# Patient Record
Sex: FEMALE | NOT HISPANIC OR LATINO | Employment: UNEMPLOYED | URBAN - METROPOLITAN AREA
[De-identification: names, ages, dates, MRNs, and addresses within clinical notes are randomized per-mention and may not be internally consistent; named-entity substitution may affect disease eponyms.]

---

## 2017-01-06 ENCOUNTER — HOSPITAL ENCOUNTER (OUTPATIENT)
Dept: LAB | Facility: MEDICAL CENTER | Age: 72
End: 2017-01-06
Attending: INTERNAL MEDICINE
Payer: MEDICARE

## 2017-01-06 DIAGNOSIS — N18.30 CHRONIC KIDNEY DISEASE, STAGE III (MODERATE) (HCC): ICD-10-CM

## 2017-01-06 LAB
25(OH)D3 SERPL-MCNC: 39 NG/ML (ref 30–100)
CREAT UR-MCNC: 28.1 MG/DL
ERYTHROCYTE [DISTWIDTH] IN BLOOD BY AUTOMATED COUNT: 46.5 FL (ref 35.9–50)
HCT VFR BLD AUTO: 37.8 % (ref 37–47)
HGB BLD-MCNC: 12.8 G/DL (ref 12–16)
MCH RBC QN AUTO: 32.3 PG (ref 27–33)
MCHC RBC AUTO-ENTMCNC: 33.9 G/DL (ref 33.6–35)
MCV RBC AUTO: 95.5 FL (ref 81.4–97.8)
MICROALBUMIN UR-MCNC: 4.5 MG/DL
MICROALBUMIN/CREAT UR: 160 MG/G (ref 0–30)
PLATELET # BLD AUTO: 199 K/UL (ref 164–446)
PMV BLD AUTO: 11.6 FL (ref 9–12.9)
PTH-INTACT SERPL-MCNC: 57.7 PG/ML (ref 14–72)
RBC # BLD AUTO: 3.96 M/UL (ref 4.2–5.4)
WBC # BLD AUTO: 8.9 K/UL (ref 4.8–10.8)

## 2017-01-06 PROCEDURE — 80048 BASIC METABOLIC PNL TOTAL CA: CPT

## 2017-01-06 PROCEDURE — 36415 COLL VENOUS BLD VENIPUNCTURE: CPT

## 2017-01-06 PROCEDURE — 82043 UR ALBUMIN QUANTITATIVE: CPT

## 2017-01-06 PROCEDURE — 82306 VITAMIN D 25 HYDROXY: CPT

## 2017-01-06 PROCEDURE — 85027 COMPLETE CBC AUTOMATED: CPT

## 2017-01-06 PROCEDURE — 83970 ASSAY OF PARATHORMONE: CPT

## 2017-01-06 PROCEDURE — 82570 ASSAY OF URINE CREATININE: CPT

## 2017-01-07 LAB
ANION GAP SERPL CALC-SCNC: 10 MMOL/L (ref 0–11.9)
BUN SERPL-MCNC: 23 MG/DL (ref 8–22)
CALCIUM SERPL-MCNC: 9.6 MG/DL (ref 8.5–10.5)
CHLORIDE SERPL-SCNC: 104 MMOL/L (ref 96–112)
CO2 SERPL-SCNC: 23 MMOL/L (ref 20–33)
CREAT SERPL-MCNC: 1.23 MG/DL (ref 0.5–1.4)
GLUCOSE SERPL-MCNC: 89 MG/DL (ref 65–99)
POTASSIUM SERPL-SCNC: 4.1 MMOL/L (ref 3.6–5.5)
SODIUM SERPL-SCNC: 137 MMOL/L (ref 135–145)

## 2017-01-10 ENCOUNTER — TELEPHONE (OUTPATIENT)
Dept: MEDICAL GROUP | Facility: MEDICAL CENTER | Age: 72
End: 2017-01-10

## 2017-01-10 DIAGNOSIS — E11.3299 DIABETES MELLITUS WITH BACKGROUND RETINOPATHY (HCC): ICD-10-CM

## 2017-01-10 NOTE — TELEPHONE ENCOUNTER
VOICEMAIL  1. Caller Name: Lili                      Call Back Number: [phone]     2. Message: Left a VM for the patient letting her know per doctor, prescription has been sent to the patient pharmacy and to call back with any questions.    3. Patient approves office to leave a detailed voicemail/MyChart message: N\A

## 2017-01-10 NOTE — TELEPHONE ENCOUNTER
Was the patient seen in the last year in this department? Yes 10/0716    Does patient have an active prescription for medications requested? No     Received Request Via: Pharmacy

## 2017-01-12 ENCOUNTER — HOSPITAL ENCOUNTER (OUTPATIENT)
Facility: MEDICAL CENTER | Age: 72
End: 2017-01-12
Attending: INTERNAL MEDICINE
Payer: MEDICARE

## 2017-01-12 PROCEDURE — 82274 ASSAY TEST FOR BLOOD FECAL: CPT

## 2017-01-13 ENCOUNTER — OFFICE VISIT (OUTPATIENT)
Dept: NEPHROLOGY | Facility: MEDICAL CENTER | Age: 72
End: 2017-01-13
Payer: MEDICARE

## 2017-01-13 ENCOUNTER — APPOINTMENT (OUTPATIENT)
Dept: NEPHROLOGY | Facility: MEDICAL CENTER | Age: 72
End: 2017-01-13
Payer: MEDICARE

## 2017-01-13 VITALS
TEMPERATURE: 98.7 F | RESPIRATION RATE: 14 BRPM | DIASTOLIC BLOOD PRESSURE: 78 MMHG | HEART RATE: 78 BPM | WEIGHT: 210 LBS | HEIGHT: 62 IN | SYSTOLIC BLOOD PRESSURE: 130 MMHG | BODY MASS INDEX: 38.64 KG/M2

## 2017-01-13 DIAGNOSIS — E11.3299 DIABETES MELLITUS WITH BACKGROUND RETINOPATHY (HCC): ICD-10-CM

## 2017-01-13 DIAGNOSIS — N18.30 CHRONIC KIDNEY DISEASE, STAGE III (MODERATE) (HCC): ICD-10-CM

## 2017-01-13 DIAGNOSIS — I10 ESSENTIAL HYPERTENSION: ICD-10-CM

## 2017-01-13 PROCEDURE — 99213 OFFICE O/P EST LOW 20 MIN: CPT | Performed by: INTERNAL MEDICINE

## 2017-01-13 ASSESSMENT — ENCOUNTER SYMPTOMS
CHILLS: 0
FEVER: 0
PALPITATIONS: 0

## 2017-01-13 NOTE — PROGRESS NOTES
"Subjective:      Abigail Britton is a 71 y.o. female who presents with CKD3 from DN Follow-Up            HPI  70 year old with CKD stage III. She is felt to have chronic kidney disease from diabetic nephropathy.     1. CKD stage III - Cr stable, no uremic symptoms, tolerating BP meds on cozaar  2. HTN - BP at goal  3. Diabetic nephropathy - microalbuminuria, 193, on cozaar     Review of Systems   Constitutional: Negative for fever and chills.   Cardiovascular: Negative for chest pain and palpitations.          Objective:     /78 mmHg  Pulse 78  Temp(Src) 37.1 °C (98.7 °F) (Temporal)  Resp 14  Ht 1.575 m (5' 2\")  Wt 95.255 kg (210 lb)  BMI 38.40 kg/m2     Physical Exam   Constitutional: She is oriented to person, place, and time. She appears well-developed and well-nourished.   Cardiovascular: Normal rate and regular rhythm.    Pulmonary/Chest: Effort normal and breath sounds normal.   Neurological: She is alert and oriented to person, place, and time.   Skin: Skin is warm and dry.               Assessment/Plan:     1. Chronic kidney disease, stage III (moderate)  Cr stable, continue ARB, check CKD labs, discussed with patient and questions answered.  - BASIC METABOLIC PANEL; Future  - CBC WITHOUT DIFFERENTIAL; Future  - PTH INTACT (PTH ONLY); Future  - VITAMIN D,25 HYDROXY; Future  - MICROALBUMIN CREAT RATIO URINE; Future    2. Essential hypertension  BP at goal, continue current medications, has tolerated toprol XL well        "

## 2017-01-13 NOTE — MR AVS SNAPSHOT
"        Abigail Britton   2017 1:45 PM   Office Visit   MRN: [id number]    Department:  [facility]   Dept Phone:  [phone]    Description:  Female : 1945   Provider:  Jamel Gonzalez M.D.           Reason for Visit     Follow-Up           Allergies as of 2017     Allergen Noted Reactions    Lisinopril 2012       \"cough\"      You were diagnosed with     Chronic kidney disease, stage III (moderate)   [585.3.ICD-9-CM]       Essential hypertension   [8708671]         Vital Signs     Blood Pressure Pulse Temperature Respirations Height Weight    130/78 mmHg 78 37.1 °C (98.7 °F) (Temporal) 14 1.575 m (5' 2\") 95.255 kg (210 lb)    Body Mass Index Smoking Status                38.40 kg/m2 Never Smoker           Basic Information     Date Of Birth Sex Race Ethnicity Preferred Language    1945 Female Unknown Non- English      Your appointments     Mar 29, 2017 11:40 AM   Diabetes Care Visit with Alessandro Scott M.D., REBECCA DIABETES RN   [facility] [address]    [address] [facility] [address]  [facility] [id number] [phone]           You will be receiving a confirmation call a few days before your appointment from our automated call confirmation system.              Problem List              ICD-10-CM Priority Class Noted - Resolved    Diabetes type 2, controlled (HCC) E11.9 High  2012 - Present    Essential hypertension I10   2012 - Present    Gout, arthropathy (Chronic) M10.9   2012 - Present    Dyslipidemia E78.5   2012 - Present    Systolic murmur R01.1   2012 - Present    Microalbuminuria R80.9   2012 - Present    MEDICAL HOME    Unknown - Present    Chronic kidney disease, stage III (moderate) N18.3 High  2013 - Present    Diabetic nephropathy (CMS-HCC) E11.21   11/15/2013 - Present    Severe obesity (BMI 35.0-35.9 with comorbidity) (HCC) E66.01, Z68.35   2014 - Present    Asthma, mild intermittent J45.20  "  9/22/2014 - Present    Diabetes mellitus with background retinopathy (HCC) E11.319 High  4/22/2015 - Present    Diabetic macular edema (CMS-HCC) E11.311 High  4/22/2015 - Present    Diastolic dysfunction I51.9 High  4/22/2015 - Present    Secondary hyperparathyroidism (CMS-HCC) N25.81 High  4/22/2015 - Present    History of TIA (transient ischemic attack) (Chronic) Z86.73   5/15/2015 - Present    PAD (peripheral artery disease) (HCC) I73.9   1/5/2016 - Present      Health Maintenance        Date Due Completion Dates    IMM DTaP/Tdap/Td Vaccine (1 - Tdap) 11/14/1966 ---    IMM ZOSTER VACCINE 11/14/2007 ---    COLON CANCER SCREENING ANNUAL FIT 11/25/2016 11/25/2015, 9/30/2014, 2/7/2013    MAMMOGRAM 12/31/2016 12/31/2015, 10/15/2013, 10/14/2013, 8/1/2012 (Done)    Override on 8/1/2012: Done    FASTING LIPID PROFILE 3/31/2017 3/31/2016, 4/22/2015, 9/24/2014, 5/27/2014, 3/27/2014, 11/13/2013, 2/4/2013, 7/25/2012, 3/28/2012 (Done)    Override on 3/28/2012: Done    DIABETES MONOFILAMTENT / LE EXAM 4/4/2017 4/4/2016, 1/9/2015, 11/15/2013, 5/13/2013 (Done), 4/17/2012 (Done)    Override on 5/13/2013: Done    Override on 4/17/2012: Done    A1C SCREENING 4/5/2017 10/5/2016, 6/30/2016, 3/31/2016, 11/16/2015, 8/14/2015, 4/22/2015, 9/24/2014, 5/27/2014, 3/27/2014, 11/13/2013, 5/9/2013, 2/4/2013, 10/23/2012, 7/25/2012, 2/22/2011    RETINAL SCREENING 6/6/2017 6/6/2016, 3/14/2016, 2/1/2016, 10/22/2015, 8/19/2015, 7/17/2015, 6/9/2015    IMM PNEUMOCOCCAL 65+ (ADULT) LOW/MEDIUM RISK SERIES (2 of 2 - PPSV23) 11/1/2017 5/9/2016, 11/1/2012    URINE ACR / MICROALBUMIN 1/6/2018 1/6/2017, 6/30/2016, 3/10/2016, 8/14/2015, 4/22/2015, 4/22/2015, 11/3/2014, 9/24/2014, 5/27/2014, 3/27/2014, 11/13/2013, 9/20/2013, 2/4/2013, 11/1/2012, 7/25/2012    SERUM CREATININE 1/6/2018 1/6/2017, 6/30/2016, 3/31/2016, 3/10/2016, 8/14/2015, 4/22/2015, 4/22/2015, 11/3/2014, 9/24/2014, 9/24/2014, 5/27/2014, 3/27/2014, 11/13/2013, 9/20/2013, 7/16/2013, 1/3/2013,  12/31/2012, 10/23/2012, 8/16/2012, 8/15/2012, 7/25/2012, 3/28/2012 (Done)    Override on 3/28/2012: Done    BONE DENSITY 2/22/2018 2/22/2013            Current Immunizations     13-VALENT PCV PREVNAR 5/9/2016    Influenza TIV (IM) 9/18/2015, 11/15/2013  9:00 AM, 11/1/2012    Influenza Vaccine Adult HD 10/7/2016    Influenza Vaccine Quad Inj (Pf) 9/30/2014    Pneumococcal polysaccharide vaccine (PPSV-23) 11/1/2012      Below and/or attached are the medications your provider expects you to take. Review all of your home medications and newly ordered medications with your provider and/or pharmacist. Follow medication instructions as directed by your provider and/or pharmacist. Please keep your medication list with you and share with your provider. Update the information when medications are discontinued, doses are changed, or new medications (including over-the-counter products) are added; and carry medication information at all times in the event of emergency situations     Allergies:  LISINOPRIL - (reactions not documented)               Medications  Valid as of: January 13, 2017 -  2:16 PM    Generic Name Brand Name Tablet Size Instructions for use    Albuterol Sulfate (Aero Soln) VENTOLIN  (90 BASE) MCG/ACT INHALE TWO PUFFS BY MOUTH EVERY 6 HOURS AS NEEDED FOR SHORTNESS OF BREATH        Allopurinol (Tab) ZYLOPRIM 100 MG TAKE TWO TABLETS BY MOUTH DAILY        Aspirin (Tab)  MG Take 1 Tab by mouth every 6 hours as needed for Mild Pain.        Atorvastatin Calcium (Tab) LIPITOR 20 MG Take 1 Tab by mouth every day.        Blood Glucose Monitoring Suppl (Misc) Blood Glucose Monitoring Suppl SUPPLIES Contour next EZ monitor kit use TID        Cholecalciferol (Tab) cholecalciferol 1000 UNIT Take 1,000 Units by mouth 2 Times a Day.        Clopidogrel Bisulfate (Tab) PLAVIX 75 MG TAKE ONE TABLET BY MOUTH DAILY - GENERIC FOR PLAVIX        Clopidogrel Bisulfate (Tab) PLAVIX 75 MG Take 1 Tab by mouth every day.         Colchicine (Tab) COLCRYS 0.6 MG TAKE ONE TABLET BY MOUTH AS NEEDED AT FIRST SIGN OF GOUT, MAY REPEAT IN 1 HOUR IF SYMPTOMS PERSIST.        Doxazosin Mesylate (Tab) CARDURA 2 MG Take 1 Tab by mouth every day.        Furosemide (Tab) LASIX 20 MG Take 1 Tab by mouth every day.        Insulin Aspart (Solution Pen-injector) NOVOLOG 100 UNIT/ML Inject 3-15 Units as instructed every day with lunch.        Insulin Glargine (Solution Pen-injector) LANTUS 100 UNIT/ML Inject 36 Units as instructed every evening.        Insulin Pen Needle (Misc) Insulin Pen Needle 31G X 5 MM 1 Each by Other route 4 times a day. TEST TID FOR HIGH AND LOW SUGAR LEVEL DX: E 11.65 (USE WITH LANTUS PEN)        Linagliptin (Tab) TRADJENTA 5 MG TAKE ONE TABLET BY MOUTH DAILY        Losartan Potassium (Tab) COZAAR 100 MG TAKE ONE TABLET BY MOUTH DAILY        MetFORMIN HCl (Tab) GLUCOPHAGE 1000 MG Take 1 Tab by mouth 2 times a day, with meals.        Metoprolol Succinate (TABLET SR 24 HR) TOPROL XL 50 MG Take 1 Tab by mouth every day.        .                 Medicines prescribed today were sent to:     [facility] #[id number] - [place], [place] - [address] [place] [address] AT [address] [address]    Phone: [phone] Fax: [phone]    Open 24 Hours?: No      Medication refill instructions:       If your prescription bottle indicates you have medication refills left, it is not necessary to call your provider’s office. Please contact your pharmacy and they will refill your medication.    If your prescription bottle indicates you do not have any refills left, you may request refills at any time through one of the following ways: The online [employer] system (except Urgent Care), by calling your provider’s office, or by asking your pharmacy to contact your provider’s office with a refill request. Medication refills are processed only during regular business hours and may not be available until the next business day. Your provider may request  additional information or to have a follow-up visit with you prior to refilling your medication.   *Please Note: Medication refills are assigned a new Rx number when refilled electronically. Your pharmacy may indicate that no refills were authorized even though a new prescription for the same medication is available at the pharmacy. Please request the medicine by name with the pharmacy before contacting your provider for a refill.        Your To Do List     Future Labs/Procedures Complete By Expires    BASIC METABOLIC PANEL  As directed 7/13/2017    CBC WITHOUT DIFFERENTIAL  As directed 7/13/2017    MICROALBUMIN CREAT RATIO URINE  As directed 7/15/2017    PTH INTACT (PTH ONLY)  As directed 1/14/2018    VITAMIN D,25 HYDROXY  As directed 7/16/2017      Other Notes About Your Plan     Patient is enrolled in [facility] with Dr Tyler Lynch Access Code: Activation code not generated  Current rCis Status: Active

## 2017-01-16 DIAGNOSIS — Z12.11 SCREENING FOR COLON CANCER: ICD-10-CM

## 2017-01-18 ENCOUNTER — TELEPHONE (OUTPATIENT)
Dept: MEDICAL GROUP | Facility: MEDICAL CENTER | Age: 72
End: 2017-01-18

## 2017-01-18 ENCOUNTER — OFFICE VISIT (OUTPATIENT)
Dept: URGENT CARE | Facility: PHYSICIAN GROUP | Age: 72
End: 2017-01-18
Payer: MEDICARE

## 2017-01-18 VITALS
HEART RATE: 74 BPM | TEMPERATURE: 97.3 F | SYSTOLIC BLOOD PRESSURE: 132 MMHG | DIASTOLIC BLOOD PRESSURE: 82 MMHG | WEIGHT: 211 LBS | BODY MASS INDEX: 38.83 KG/M2 | HEIGHT: 62 IN | OXYGEN SATURATION: 93 %

## 2017-01-18 DIAGNOSIS — M54.50 ACUTE LEFT-SIDED LOW BACK PAIN WITHOUT SCIATICA: ICD-10-CM

## 2017-01-18 DIAGNOSIS — M62.830 SPASM OF BACK MUSCLES: ICD-10-CM

## 2017-01-18 LAB — HEMOCCULT STL QL IA: NEGATIVE

## 2017-01-18 PROCEDURE — 99214 OFFICE O/P EST MOD 30 MIN: CPT | Performed by: EMERGENCY MEDICINE

## 2017-01-18 RX ORDER — TRAMADOL HYDROCHLORIDE 50 MG/1
50 TABLET ORAL EVERY 6 HOURS PRN
Qty: 12 TAB | Refills: 0 | Status: SHIPPED | OUTPATIENT
Start: 2017-01-18 | End: 2017-07-10

## 2017-01-18 RX ORDER — DIAZEPAM 2 MG/1
2 TABLET ORAL EVERY 8 HOURS PRN
Qty: 9 TAB | Refills: 0 | Status: SHIPPED | OUTPATIENT
Start: 2017-01-18 | End: 2017-07-10

## 2017-01-18 ASSESSMENT — ENCOUNTER SYMPTOMS
FEVER: 0
NUMBNESS: 0
BACK PAIN: 1
ABDOMINAL PAIN: 0
BOWEL INCONTINENCE: 0
WEAKNESS: 0
PARESIS: 0
LEG PAIN: 0
TINGLING: 0
PARESTHESIAS: 0

## 2017-01-18 NOTE — PATIENT INSTRUCTIONS
You should contact a primary care provider for follow-up if not resolving in 5-7 days.  Back Pain, Adult  Back pain is very common. The pain often gets better over time. The cause of back pain is usually not dangerous. Most people can learn to manage their back pain on their own.   HOME CARE   Watch your back pain for any changes. The following actions may help to lessen any pain you are feeling:  · Stay active. Start with short walks on flat ground if you can. Try to walk farther each day.  · Exercise regularly as told by your doctor. Exercise helps your back heal faster. It also helps avoid future injury by keeping your muscles strong and flexible.  · Do not sit, drive, or  one place for more than 30 minutes.  · Do not stay in bed. Resting more than 1-2 days can slow down your recovery.  · Be careful when you bend or lift an object. Use good form when lifting:  ¨ Bend at your knees.  ¨ Keep the object close to your body.  ¨ Do not twist.  · Sleep on a firm mattress. Lie on your side, and bend your knees. If you lie on your back, put a pillow under your knees.  · Take medicines only as told by your doctor.  · Put ice on the injured area.  ¨ Put ice in a plastic bag.  ¨ Place a towel between your skin and the bag.  ¨ Leave the ice on for 20 minutes, 2-3 times a day for the first 2-3 days. After that, you can switch between ice and heat packs.  · Avoid feeling anxious or stressed. Find good ways to deal with stress, such as exercise.  · Maintain a healthy weight. Extra weight puts stress on your back.  GET HELP IF:   · You have pain that does not go away with rest or medicine.  · You have worsening pain that goes down into your legs or buttocks.  · You have pain that does not get better in one week.  · You have pain at night.  · You lose weight.  · You have a fever or chills.  GET HELP RIGHT AWAY IF:   · You cannot control when you poop (bowel movement) or pee (urinate).  · Your arms or legs feel  weak.  · Your arms or legs lose feeling (numbness).  · You feel sick to your stomach (nauseous) or throw up (vomit).  · You have belly (abdominal) pain.  · You feel like you may pass out (faint).     This information is not intended to replace advice given to you by your health care provider. Make sure you discuss any questions you have with your health care provider.     Document Released: 06/05/2009 Document Revised: 01/08/2016 Document Reviewed: 04/21/2015  [employer] Interactive Patient Education ©2016 [employer]

## 2017-01-18 NOTE — PROGRESS NOTES
Subjective:      Abigail Britton is a 71 y.o. female who presents with Back Pain            Back Pain  This is a new problem. The current episode started yesterday. The problem occurs constantly. The problem is unchanged. The pain is present in the lumbar spine. The quality of the pain is described as aching. The pain does not radiate. Pertinent negatives include no abdominal pain, bladder incontinence, bowel incontinence, fever, leg pain, numbness, paresis, paresthesias, tingling or weakness. She has tried analgesics for the symptoms. The treatment provided no relief.    onset associated with lifting yesterday    Review of Systems   Constitutional: Negative for fever.   Gastrointestinal: Negative for abdominal pain and bowel incontinence.   Genitourinary: Negative for bladder incontinence.   Musculoskeletal: Positive for back pain.   Skin: Negative for rash.   Neurological: Negative for tingling, weakness, numbness and paresthesias.     PMH:  has a past medical history of GOUT (4/17/2012); ASTHMA (4/17/2012); Diabetes; Hypertension; Pneumonia; CATARACT; Chronic renal disease, stage I (8/28/2012); MEDICAL HOME; and History of TIA (transient ischemic attack) (5/15/2015).  MEDS:   Current outpatient prescriptions:   •  tramadol (ULTRAM) 50 MG Tab, Take 1 Tab by mouth every 6 hours as needed for Severe Pain., Disp: 12 Tab, Rfl: 0  •  diazepam (VALIUM) 2 MG Tab, Take 1 Tab by mouth every 8 hours as needed (spasm)., Disp: 9 Tab, Rfl: 0  •  Blood Glucose Monitoring Suppl SUPPLIES Misc, Contour next EZ monitor kit use TID, Disp: 100 Each, Rfl: 11  •  metformin (GLUCOPHAGE) 1000 MG tablet, Take 1 Tab by mouth 2 times a day, with meals., Disp: 60 Tab, Rfl: 11  •  insulin glargine (LANTUS SOLOSTAR) 100 UNIT/ML Solution Pen-injector injection, Inject 36 Units as instructed every evening., Disp: 5 PEN, Rfl: 3  •  allopurinol (ZYLOPRIM) 100 MG Tab, TAKE TWO TABLETS BY MOUTH DAILY, Disp: 60 Tab, Rfl: 7  •  clopidogrel (PLAVIX)  "75 MG Tab, Take 1 Tab by mouth every day., Disp: 30 Tab, Rfl: 6  •  NOVOLOG, insulin aspart, (NOVOLOG FLEXPEN) 100 UNIT/ML Solution Pen-injector injection, Inject 3-15 Units as instructed every day with lunch., Disp: 5 PEN, Rfl: 3  •  doxazosin (CARDURA) 2 MG Tab, Take 1 Tab by mouth every day., Disp: 30 Tab, Rfl: 11  •  metoprolol SR (TOPROL XL) 50 MG TABLET SR 24 HR, Take 1 Tab by mouth every day., Disp: 90 Tab, Rfl: 4  •  vitamin D (CHOLECALCIFEROL) 1000 UNIT Tab, Take 1,000 Units by mouth 2 Times a Day., Disp: , Rfl:   •  linagliptin (TRADJENTA) 5 MG Tab tablet, TAKE ONE TABLET BY MOUTH DAILY, Disp: 30 Tab, Rfl: 11  •  losartan (COZAAR) 100 MG Tab, TAKE ONE TABLET BY MOUTH DAILY, Disp: 90 Tab, Rfl: 3  •  atorvastatin (LIPITOR) 20 MG Tab, Take 1 Tab by mouth every day., Disp: 30 Tab, Rfl: 11  •  Insulin Pen Needle (B-D UF III MINI PEN NEEDLES) 31G X 5 MM Misc, 1 Each by Other route 4 times a day. TEST TID FOR HIGH AND LOW SUGAR LEVEL DX: E 11.65 (USE WITH LANTUS PEN), Disp: 100 Each, Rfl: 11  •  clopidogrel (PLAVIX) 75 MG Tab, TAKE ONE TABLET BY MOUTH DAILY - GENERIC FOR PLAVIX, Disp: 30 Tab, Rfl: 6  •  VENTOLIN  (90 BASE) MCG/ACT Aero Soln inhalation aerosol, INHALE TWO PUFFS BY MOUTH EVERY 6 HOURS AS NEEDED FOR SHORTNESS OF BREATH, Disp: 1 Inhaler, Rfl: 6  •  COLCRYS 0.6 MG TABS, TAKE ONE TABLET BY MOUTH AS NEEDED AT FIRST SIGN OF GOUT, MAY REPEAT IN 1 HOUR IF SYMPTOMS PERSIST., Disp: 30 Tab, Rfl: 5  •  furosemide (LASIX) 20 MG TABS, Take 1 Tab by mouth every day., Disp: 30 Each, Rfl: 11  •  aspirin (ASA) 325 MG TABS, Take 1 Tab by mouth every 6 hours as needed for Mild Pain., Disp: 30 Each, Rfl: 6  ALLERGIES:   Allergies   Allergen Reactions   • Lisinopril      \"cough\"     SURGHX:   Past Surgical History   Procedure Laterality Date   • Tonsillectomy       SOCHX:  reports that she has never smoked. She has never used smokeless tobacco. She reports that she does not drink alcohol or use illicit " "drugs.  FH: family history includes Diabetes in her brother; Genetic in her sister; Hypertension in her brother and sister.       Objective:     /82 mmHg  Pulse 74  Temp(Src) 36.3 °C (97.3 °F)  Ht 1.575 m (5' 2\")  Wt 95.709 kg (211 lb)  BMI 38.58 kg/m2  SpO2 93%     Physical Exam   Constitutional: Vital signs are normal. She is cooperative. She does not have a sickly appearance. She does not appear ill.   Cardiovascular:   Pulses:       Dorsalis pedis pulses are 1+ on the right side, and 1+ on the left side.   Abdominal: She exhibits no distension. There is no tenderness. There is no CVA tenderness.   Musculoskeletal:        Lumbar back: She exhibits decreased range of motion and tenderness. She exhibits no bony tenderness, no swelling, no edema and no deformity.        Back:    Straight leg raise negative   Neurological: She is alert. She has normal strength. No sensory deficit.   Reflex Scores:       Patellar reflexes are 1+ on the right side and 1+ on the left side.       Achilles reflexes are 1+ on the right side and 1+ on the left side.  BLE   Skin: Skin is warm and dry.   Psychiatric: She has a normal mood and affect.               Assessment/Plan:     1. Acute left-sided low back pain without sciatica  Advised scheduled Tylenol for pain relief; and Ultram only as needed.  - tramadol (ULTRAM) 50 MG Tab; Take 1 Tab by mouth every 6 hours as needed for Severe Pain.  Dispense: 12 Tab; Refill: 0    2. Spasm of back muscles  Advised using cold/heat application; and Valium as needed for severe spasm.  - diazepam (VALIUM) 2 MG Tab; Take 1 Tab by mouth every 8 hours as needed (spasm).  Dispense: 9 Tab; Refill: 0        "

## 2017-01-18 NOTE — MR AVS SNAPSHOT
"        Abigail Britton   2017 8:00 AM   Office Visit   MRN: [id number]    Department:  [facility]   Dept Phone:  [phone]    Description:  Female : 1945   Provider:  Jim Gupta M.D.           Reason for Visit     Back Pain x1day. Low back pain, was lifting children yesterday       Allergies as of 2017     Allergen Noted Reactions    Lisinopril 2012       \"cough\"      You were diagnosed with     Acute left-sided low back pain without sciatica   [0109636]       Spasm of back muscles   [828264]         Vital Signs     Blood Pressure Pulse Temperature Height Weight Body Mass Index    132/82 mmHg 74 36.3 °C (97.3 °F) 1.575 m (5' 2\") 95.709 kg (211 lb) 38.58 kg/m2    Oxygen Saturation Smoking Status                93% Never Smoker           Basic Information     Date Of Birth Sex Race Ethnicity Preferred Language    1945 Female Unknown Non- English      Your appointments     Mar 29, 2017 11:40 AM   Diabetes Care Visit with Alessandro Scott M.D., BILL DIABETES RN   [facility] [address]    [address]  [address]  [facility] [id number] [phone]           You will be receiving a confirmation call a few days before your appointment from our automated call confirmation system.              Problem List              ICD-10-CM Priority Class Noted - Resolved    Diabetes type 2, controlled (CMS-HCC) E11.9 High  2012 - Present    Essential hypertension I10   2012 - Present    Gout, arthropathy (Chronic) M10.9   2012 - Present    Dyslipidemia E78.5   2012 - Present    Systolic murmur R01.1   2012 - Present    Microalbuminuria R80.9   2012 - Present    MEDICAL HOME    Unknown - Present    Chronic kidney disease, stage III (moderate) N18.3 High  2013 - Present    Diabetic nephropathy (CMS-HCC) E11.21   11/15/2013 - Present    Severe obesity (BMI 35.0-35.9 with comorbidity) (CMS-HCC) E66.01, Z68.35   2014 - " Present    Asthma, mild intermittent J45.20   9/22/2014 - Present    Diabetes mellitus with background retinopathy (CMS-HCC) E11.319 High  4/22/2015 - Present    Diabetic macular edema (CMS-HCC) E11.311 High  4/22/2015 - Present    Diastolic dysfunction I51.9 High  4/22/2015 - Present    Secondary hyperparathyroidism (CMS-HCC) N25.81 High  4/22/2015 - Present    History of TIA (transient ischemic attack) (Chronic) Z86.73   5/15/2015 - Present    PAD (peripheral artery disease) (CMS-HCC) I73.9   1/5/2016 - Present      Health Maintenance        Date Due Completion Dates    IMM DTaP/Tdap/Td Vaccine (1 - Tdap) 11/14/1966 ---    IMM ZOSTER VACCINE 11/14/2007 ---    MAMMOGRAM 12/31/2016 12/31/2015, 10/15/2013, 10/14/2013, 8/1/2012 (Done)    Override on 8/1/2012: Done    FASTING LIPID PROFILE 3/31/2017 3/31/2016, 4/22/2015, 9/24/2014, 5/27/2014, 3/27/2014, 11/13/2013, 2/4/2013, 7/25/2012, 3/28/2012 (Done)    Override on 3/28/2012: Done    DIABETES MONOFILAMENT / LE EXAM 4/4/2017 4/4/2016, 1/9/2015, 11/15/2013, 5/13/2013 (Done), 4/17/2012 (Done)    Override on 5/13/2013: Done    Override on 4/17/2012: Done    A1C SCREENING 4/5/2017 10/5/2016, 6/30/2016, 3/31/2016, 11/16/2015, 8/14/2015, 4/22/2015, 9/24/2014, 5/27/2014, 3/27/2014, 11/13/2013, 5/9/2013, 2/4/2013, 10/23/2012, 7/25/2012, 2/22/2011    RETINAL SCREENING 6/6/2017 6/6/2016, 3/14/2016, 2/1/2016, 10/22/2015, 8/19/2015, 7/17/2015, 6/9/2015    IMM PNEUMOCOCCAL 65+ (ADULT) LOW/MEDIUM RISK SERIES (2 of 2 - PPSV23) 11/1/2017 5/9/2016, 11/1/2012    URINE ACR / MICROALBUMIN 1/6/2018 1/6/2017, 6/30/2016, 3/10/2016, 8/14/2015, 4/22/2015, 4/22/2015, 11/3/2014, 9/24/2014, 5/27/2014, 3/27/2014, 11/13/2013, 9/20/2013, 2/4/2013, 11/1/2012, 7/25/2012    SERUM CREATININE 1/6/2018 1/6/2017, 6/30/2016, 3/31/2016, 3/10/2016, 8/14/2015, 4/22/2015, 4/22/2015, 11/3/2014, 9/24/2014, 9/24/2014, 5/27/2014, 3/27/2014, 11/13/2013, 9/20/2013, 7/16/2013, 1/3/2013, 12/31/2012, 10/23/2012,  8/16/2012, 8/15/2012, 7/25/2012, 3/28/2012 (Done)    Override on 3/28/2012: Done    COLON CANCER SCREENING ANNUAL FIT 1/12/2018 1/12/2017, 11/25/2015, 9/30/2014, 2/7/2013    BONE DENSITY 2/22/2018 2/22/2013            Current Immunizations     13-VALENT PCV PREVNAR 5/9/2016    Influenza TIV (IM) 9/18/2015, 11/15/2013  9:00 AM, 11/1/2012    Influenza Vaccine Adult HD 10/7/2016    Influenza Vaccine Quad Inj (Pf) 9/30/2014    Pneumococcal polysaccharide vaccine (PPSV-23) 11/1/2012      Below and/or attached are the medications your provider expects you to take. Review all of your home medications and newly ordered medications with your provider and/or pharmacist. Follow medication instructions as directed by your provider and/or pharmacist. Please keep your medication list with you and share with your provider. Update the information when medications are discontinued, doses are changed, or new medications (including over-the-counter products) are added; and carry medication information at all times in the event of emergency situations     Allergies:  LISINOPRIL - (reactions not documented)               Medications  Valid as of: January 18, 2017 -  5:36 PM    Generic Name Brand Name Tablet Size Instructions for use    Albuterol Sulfate (Aero Soln) VENTOLIN  (90 BASE) MCG/ACT INHALE TWO PUFFS BY MOUTH EVERY 6 HOURS AS NEEDED FOR SHORTNESS OF BREATH        Allopurinol (Tab) ZYLOPRIM 100 MG TAKE TWO TABLETS BY MOUTH DAILY        Aspirin (Tab)  MG Take 1 Tab by mouth every 6 hours as needed for Mild Pain.        Atorvastatin Calcium (Tab) LIPITOR 20 MG Take 1 Tab by mouth every day.        Blood Glucose Monitoring Suppl (Misc) Blood Glucose Monitoring Suppl SUPPLIES Contour next EZ monitor kit use TID        Blood Glucose Monitoring Suppl (Misc) Blood Glucose Monitoring Suppl SUPPLIES Test strips order: Test strips for Accucheck Linda meter. Sig: use tid  #100 RF x 3        Cholecalciferol (Tab)  cholecalciferol 1000 UNIT Take 1,000 Units by mouth 2 Times a Day.        Clopidogrel Bisulfate (Tab) PLAVIX 75 MG TAKE ONE TABLET BY MOUTH DAILY - GENERIC FOR PLAVIX        Clopidogrel Bisulfate (Tab) PLAVIX 75 MG Take 1 Tab by mouth every day.        Colchicine (Tab) COLCRYS 0.6 MG TAKE ONE TABLET BY MOUTH AS NEEDED AT FIRST SIGN OF GOUT, MAY REPEAT IN 1 HOUR IF SYMPTOMS PERSIST.        DiazePAM (Tab) VALIUM 2 MG Take 1 Tab by mouth every 8 hours as needed (spasm).        Doxazosin Mesylate (Tab) CARDURA 2 MG Take 1 Tab by mouth every day.        Furosemide (Tab) LASIX 20 MG Take 1 Tab by mouth every day.        Insulin Aspart (Solution Pen-injector) NOVOLOG 100 UNIT/ML Inject 3-15 Units as instructed every day with lunch.        Insulin Glargine (Solution Pen-injector) LANTUS 100 UNIT/ML Inject 36 Units as instructed every evening.        Insulin Pen Needle (Misc) Insulin Pen Needle 31G X 5 MM 1 Each by Other route 4 times a day. TEST TID FOR HIGH AND LOW SUGAR LEVEL DX: E 11.65 (USE WITH LANTUS PEN)        Linagliptin (Tab) TRADJENTA 5 MG TAKE ONE TABLET BY MOUTH DAILY        Losartan Potassium (Tab) COZAAR 100 MG TAKE ONE TABLET BY MOUTH DAILY        MetFORMIN HCl (Tab) GLUCOPHAGE 1000 MG Take 1 Tab by mouth 2 times a day, with meals.        Metoprolol Succinate (TABLET SR 24 HR) TOPROL XL 50 MG Take 1 Tab by mouth every day.        TraMADol HCl (Tab) ULTRAM 50 MG Take 1 Tab by mouth every 6 hours as needed for Severe Pain.        .                 Medicines prescribed today were sent to:     [facility] #[id number] - [place], [place] - [address] [place] [address] AT [address] [address]    Phone: [phone] Fax: [phone]    Open 24 Hours?: No      Medication refill instructions:       If your prescription bottle indicates you have medication refills left, it is not necessary to call your provider’s office. Please contact your pharmacy and they will refill your medication.    If your prescription  bottle indicates you do not have any refills left, you may request refills at any time through one of the following ways: The online [employer] system (except Urgent Care), by calling your provider’s office, or by asking your pharmacy to contact your provider’s office with a refill request. Medication refills are processed only during regular business hours and may not be available until the next business day. Your provider may request additional information or to have a follow-up visit with you prior to refilling your medication.   *Please Note: Medication refills are assigned a new Rx number when refilled electronically. Your pharmacy may indicate that no refills were authorized even though a new prescription for the same medication is available at the pharmacy. Please request the medicine by name with the pharmacy before contacting your provider for a refill.        Instructions    You should contact a primary care provider for follow-up if not resolving in 5-7 days.  Back Pain, Adult  Back pain is very common. The pain often gets better over time. The cause of back pain is usually not dangerous. Most people can learn to manage their back pain on their own.   HOME CARE   Watch your back pain for any changes. The following actions may help to lessen any pain you are feeling:  · Stay active. Start with short walks on flat ground if you can. Try to walk farther each day.  · Exercise regularly as told by your doctor. Exercise helps your back heal faster. It also helps avoid future injury by keeping your muscles strong and flexible.  · Do not sit, drive, or  one place for more than 30 minutes.  · Do not stay in bed. Resting more than 1-2 days can slow down your recovery.  · Be careful when you bend or lift an object. Use good form when lifting:  ¨ Bend at your knees.  ¨ Keep the object close to your body.  ¨ Do not twist.  · Sleep on a firm mattress. Lie on your side, and bend your knees. If you lie on your back,  put a pillow under your knees.  · Take medicines only as told by your doctor.  · Put ice on the injured area.  ¨ Put ice in a plastic bag.  ¨ Place a towel between your skin and the bag.  ¨ Leave the ice on for 20 minutes, 2-3 times a day for the first 2-3 days. After that, you can switch between ice and heat packs.  · Avoid feeling anxious or stressed. Find good ways to deal with stress, such as exercise.  · Maintain a healthy weight. Extra weight puts stress on your back.  GET HELP IF:   · You have pain that does not go away with rest or medicine.  · You have worsening pain that goes down into your legs or buttocks.  · You have pain that does not get better in one week.  · You have pain at night.  · You lose weight.  · You have a fever or chills.  GET HELP RIGHT AWAY IF:   · You cannot control when you poop (bowel movement) or pee (urinate).  · Your arms or legs feel weak.  · Your arms or legs lose feeling (numbness).  · You feel sick to your stomach (nauseous) or throw up (vomit).  · You have belly (abdominal) pain.  · You feel like you may pass out (faint).     This information is not intended to replace advice given to you by your health care provider. Make sure you discuss any questions you have with your health care provider.     Document Released: 06/05/2009 Document Revised: 01/08/2016 Document Reviewed: 04/21/2015  [employer] Interactive Patient Education ©2016 [employer]         Other Notes About Your Plan     Patient is enrolled in [facility] with Dr Tyler Lynch Access Code: Activation code not generated  Current MyChart Status: Active

## 2017-01-18 NOTE — TELEPHONE ENCOUNTER
VOICEMAIL  1. Caller Name: Abigail                      Call Back Number: [phone] (home)     2. Message: pt asking for accu check dayan test strips.  States she spoke to [employer] and they stated they will pay for them    3. Patient approves office to leave a detailed voicemail/MyChart message: N\A

## 2017-02-16 DIAGNOSIS — E11.8 CONTROLLED TYPE 2 DIABETES MELLITUS WITH COMPLICATION, WITH LONG-TERM CURRENT USE OF INSULIN (HCC): ICD-10-CM

## 2017-02-16 DIAGNOSIS — Z79.4 CONTROLLED TYPE 2 DIABETES MELLITUS WITH COMPLICATION, WITH LONG-TERM CURRENT USE OF INSULIN (HCC): ICD-10-CM

## 2017-02-17 NOTE — PROGRESS NOTES
Additionally paged on same patient because prescription for novolog was written for once daily rather than TIDAC. New Rx sent to pharmacy electronically.     - Dr. Alcantara

## 2017-02-17 NOTE — PROGRESS NOTES
Covering physician on call this week. Received call stating that patient is in need of insulin pen needles. Insulin was sent to pharmacy earlier today by PCP. Order placed for insulin pen needles that patient is currently prescribed, Rx sent to same pharmacy as insulin.    Rich Alcantara M.D.

## 2017-03-13 DIAGNOSIS — Z79.4 CONTROLLED TYPE 2 DIABETES MELLITUS WITH COMPLICATION, WITH LONG-TERM CURRENT USE OF INSULIN (HCC): ICD-10-CM

## 2017-03-13 DIAGNOSIS — E11.8 CONTROLLED TYPE 2 DIABETES MELLITUS WITH COMPLICATION, WITH LONG-TERM CURRENT USE OF INSULIN (HCC): ICD-10-CM

## 2017-03-24 ENCOUNTER — HOSPITAL ENCOUNTER (OUTPATIENT)
Dept: RADIOLOGY | Facility: MEDICAL CENTER | Age: 72
End: 2017-03-24
Attending: INTERNAL MEDICINE
Payer: MEDICARE

## 2017-03-24 ENCOUNTER — HOSPITAL ENCOUNTER (OUTPATIENT)
Dept: LAB | Facility: MEDICAL CENTER | Age: 72
End: 2017-03-24
Attending: INTERNAL MEDICINE
Payer: MEDICARE

## 2017-03-24 DIAGNOSIS — E11.9 CONTROLLED TYPE 2 DIABETES MELLITUS WITHOUT COMPLICATION, WITHOUT LONG-TERM CURRENT USE OF INSULIN (HCC): ICD-10-CM

## 2017-03-24 DIAGNOSIS — R92.8 ABNORMAL MAMMOGRAM: ICD-10-CM

## 2017-03-24 DIAGNOSIS — Z13.9 SCREENING: ICD-10-CM

## 2017-03-24 DIAGNOSIS — E78.5 DYSLIPIDEMIA: ICD-10-CM

## 2017-03-24 LAB
ALBUMIN SERPL BCP-MCNC: 4 G/DL (ref 3.2–4.9)
ALBUMIN/GLOB SERPL: 1.4 G/DL
ALP SERPL-CCNC: 85 U/L (ref 30–99)
ALT SERPL-CCNC: 14 U/L (ref 2–50)
ANION GAP SERPL CALC-SCNC: 6 MMOL/L (ref 0–11.9)
AST SERPL-CCNC: 13 U/L (ref 12–45)
BILIRUB SERPL-MCNC: 0.5 MG/DL (ref 0.1–1.5)
BUN SERPL-MCNC: 30 MG/DL (ref 8–22)
CALCIUM SERPL-MCNC: 10 MG/DL (ref 8.5–10.5)
CHLORIDE SERPL-SCNC: 104 MMOL/L (ref 96–112)
CHOLEST SERPL-MCNC: 130 MG/DL (ref 100–199)
CO2 SERPL-SCNC: 27 MMOL/L (ref 20–33)
CREAT SERPL-MCNC: 1.51 MG/DL (ref 0.5–1.4)
CREAT UR-MCNC: 65.2 MG/DL
EST. AVERAGE GLUCOSE BLD GHB EST-MCNC: 151 MG/DL
GLOBULIN SER CALC-MCNC: 2.9 G/DL (ref 1.9–3.5)
GLUCOSE SERPL-MCNC: 142 MG/DL (ref 65–99)
HBA1C MFR BLD: 6.9 % (ref 0–5.6)
HDLC SERPL-MCNC: 35 MG/DL
LDLC SERPL CALC-MCNC: 59 MG/DL
MICROALBUMIN UR-MCNC: 15.7 MG/DL
MICROALBUMIN/CREAT UR: 241 MG/G (ref 0–30)
POTASSIUM SERPL-SCNC: 4.5 MMOL/L (ref 3.6–5.5)
PROT SERPL-MCNC: 6.9 G/DL (ref 6–8.2)
SODIUM SERPL-SCNC: 137 MMOL/L (ref 135–145)
TRIGL SERPL-MCNC: 182 MG/DL (ref 0–149)

## 2017-03-24 PROCEDURE — 77063 BREAST TOMOSYNTHESIS BI: CPT

## 2017-03-30 RX ORDER — ALBUTEROL SULFATE 90 UG/1
AEROSOL, METERED RESPIRATORY (INHALATION)
Qty: 18 INHALER | Refills: 2 | Status: SHIPPED | OUTPATIENT
Start: 2017-03-30 | End: 2017-07-10

## 2017-04-19 ENCOUNTER — HOSPITAL ENCOUNTER (OUTPATIENT)
Dept: RADIOLOGY | Facility: MEDICAL CENTER | Age: 72
End: 2017-04-19
Attending: INTERNAL MEDICINE
Payer: MEDICARE

## 2017-04-19 DIAGNOSIS — R92.8 ABNORMAL MAMMOGRAM: ICD-10-CM

## 2017-04-19 PROCEDURE — G0206 DX MAMMO INCL CAD UNI: HCPCS | Mod: LT

## 2017-04-20 ENCOUNTER — TELEPHONE (OUTPATIENT)
Dept: RADIOLOGY | Facility: MEDICAL CENTER | Age: 72
End: 2017-04-20

## 2017-04-20 NOTE — TELEPHONE ENCOUNTER
NOTIFIED PT THAT DR REYES APPROVED HER TO HOLD PLAVIX 5 DAYS PRIOR TO PROCEDURE - PT WILL BEGING HOLDING ON 4/27/TML

## 2017-04-21 ENCOUNTER — OFFICE VISIT (OUTPATIENT)
Dept: MEDICAL GROUP | Facility: MEDICAL CENTER | Age: 72
End: 2017-04-21
Payer: MEDICARE

## 2017-04-21 VITALS
TEMPERATURE: 96.9 F | SYSTOLIC BLOOD PRESSURE: 120 MMHG | DIASTOLIC BLOOD PRESSURE: 70 MMHG | OXYGEN SATURATION: 95 % | RESPIRATION RATE: 16 BRPM | HEART RATE: 66 BPM | BODY MASS INDEX: 38.98 KG/M2 | WEIGHT: 211.8 LBS | HEIGHT: 62 IN

## 2017-04-21 DIAGNOSIS — E11.311 DIABETIC MACULAR EDEMA (HCC): ICD-10-CM

## 2017-04-21 DIAGNOSIS — R80.9 MICROALBUMINURIA: ICD-10-CM

## 2017-04-21 DIAGNOSIS — E78.5 DYSLIPIDEMIA: ICD-10-CM

## 2017-04-21 DIAGNOSIS — R92.8 ABNORMAL MAMMOGRAM: ICD-10-CM

## 2017-04-21 DIAGNOSIS — E11.3299 DIABETES MELLITUS WITH BACKGROUND RETINOPATHY (HCC): ICD-10-CM

## 2017-04-21 DIAGNOSIS — Z00.00 MEDICARE ANNUAL WELLNESS VISIT, SUBSEQUENT: ICD-10-CM

## 2017-04-21 DIAGNOSIS — E11.9 CONTROLLED TYPE 2 DIABETES MELLITUS WITHOUT COMPLICATION, WITHOUT LONG-TERM CURRENT USE OF INSULIN (HCC): ICD-10-CM

## 2017-04-21 DIAGNOSIS — E66.01 SEVERE OBESITY (BMI 35.0-35.9 WITH COMORBIDITY) (HCC): ICD-10-CM

## 2017-04-21 DIAGNOSIS — N18.30 CHRONIC KIDNEY DISEASE, STAGE III (MODERATE) (HCC): ICD-10-CM

## 2017-04-21 DIAGNOSIS — I10 ESSENTIAL HYPERTENSION: ICD-10-CM

## 2017-04-21 DIAGNOSIS — I73.9 PAD (PERIPHERAL ARTERY DISEASE) (HCC): ICD-10-CM

## 2017-04-21 DIAGNOSIS — I10 HTN, GOAL BELOW 130/80: ICD-10-CM

## 2017-04-21 DIAGNOSIS — Z86.73 HISTORY OF TIA (TRANSIENT ISCHEMIC ATTACK): Chronic | ICD-10-CM

## 2017-04-21 DIAGNOSIS — N25.81 SECONDARY HYPERPARATHYROIDISM (HCC): ICD-10-CM

## 2017-04-21 DIAGNOSIS — J45.20 MILD INTERMITTENT ASTHMA WITHOUT COMPLICATION: ICD-10-CM

## 2017-04-21 DIAGNOSIS — E11.21 DIABETIC NEPHROPATHY ASSOCIATED WITH TYPE 2 DIABETES MELLITUS (HCC): ICD-10-CM

## 2017-04-21 PROCEDURE — 4040F PNEUMOC VAC/ADMIN/RCVD: CPT | Performed by: INTERNAL MEDICINE

## 2017-04-21 PROCEDURE — 99214 OFFICE O/P EST MOD 30 MIN: CPT | Mod: 25 | Performed by: INTERNAL MEDICINE

## 2017-04-21 PROCEDURE — G0439 PPPS, SUBSEQ VISIT: HCPCS | Mod: 25 | Performed by: INTERNAL MEDICINE

## 2017-04-21 PROCEDURE — 3044F HG A1C LEVEL LT 7.0%: CPT | Performed by: INTERNAL MEDICINE

## 2017-04-21 PROCEDURE — 3014F SCREEN MAMMO DOC REV: CPT | Performed by: INTERNAL MEDICINE

## 2017-04-21 PROCEDURE — 1101F PT FALLS ASSESS-DOCD LE1/YR: CPT | Performed by: INTERNAL MEDICINE

## 2017-04-21 PROCEDURE — G8417 CALC BMI ABV UP PARAM F/U: HCPCS | Performed by: INTERNAL MEDICINE

## 2017-04-21 PROCEDURE — 1036F TOBACCO NON-USER: CPT | Performed by: INTERNAL MEDICINE

## 2017-04-21 PROCEDURE — G8510 SCR DEP NEG, NO PLAN REQD: HCPCS | Performed by: INTERNAL MEDICINE

## 2017-04-21 RX ORDER — LOSARTAN POTASSIUM 100 MG/1
100 TABLET ORAL DAILY
Qty: 90 TAB | Refills: 3 | Status: SHIPPED | OUTPATIENT
Start: 2017-04-21 | End: 2017-12-08 | Stop reason: SDUPTHER

## 2017-04-21 ASSESSMENT — PATIENT HEALTH QUESTIONNAIRE - PHQ9: CLINICAL INTERPRETATION OF PHQ2 SCORE: 0

## 2017-04-21 NOTE — MR AVS SNAPSHOT
"        Abigail Britton   2017 10:40 AM   Office Visit   MRN: [id number]    Department:  [address] [place] [address]   Dept Phone:  [phone]    Description:  Female : 1945   Provider:  Alessandro Scott M.D.           Reason for Visit     Hypertension refill losartan      Allergies as of 2017     Allergen Noted Reactions    Lisinopril 2012       \"cough\"      You were diagnosed with     Medicare annual wellness visit, subsequent   [487264]       Controlled type 2 diabetes mellitus without complication, without long-term current use of insulin (CMS-HCC)   [0409296]       Diabetes mellitus with background retinopathy (CMS-HCC)   [8665490]       Diabetic macular edema (CMS-HCC)   [092262]       Chronic kidney disease, stage III (moderate)   [585.3.ICD-9-CM]       Secondary hyperparathyroidism (CMS-HCC)   [841985]       History of TIA (transient ischemic attack)   [237773]       Essential hypertension   [5413309]       Dyslipidemia   [162301]       Diabetic nephropathy associated with type 2 diabetes mellitus (CMS-HCC)   [9781515]       Severe obesity (BMI 35.0-35.9 with comorbidity) (CMS-HCC)   [607265]       PAD (peripheral artery disease) (CMS-HCC)   [815341]       Mild intermittent asthma without complication   [564995]       HTN, goal below 130/80   [359045]       Microalbuminuria   [741636]         Vital Signs     Blood Pressure Pulse Temperature Respirations Height Weight    120/70 mmHg 66 36.1 °C (96.9 °F) 16 1.575 m (5' 2.01\") 96.072 kg (211 lb 12.8 oz)    Body Mass Index Oxygen Saturation Smoking Status             38.73 kg/m2 95% Never Smoker          Basic Information     Date Of Birth Sex Race Ethnicity Preferred Language    1945 Female Unknown Non- English      Your appointments     May 02, 2017  8:30 AM   STEREOBIOPSY with RB BX 1   [facility] [address]    [address]  [place] [address]   [phone]            Jul 10, 2017 " 10:40 AM   Diabetes Care Visit with Alessandro Scott M.D., BILL DIABETES RN   [facility] [address]    [address]  [address]  [place] [id number] [phone]           You will be receiving a confirmation call a few days before your appointment from our automated call confirmation system.              Problem List              ICD-10-CM Priority Class Noted - Resolved    Diabetes type 2, controlled (CMS-HCC) E11.9 High  2/29/2012 - Present    Essential hypertension I10   2/29/2012 - Present    Gout, arthropathy (Chronic) M10.9   4/17/2012 - Present    Dyslipidemia E78.5   4/17/2012 - Present    Systolic murmur R01.1   8/16/2012 - Present    Microalbuminuria R80.9   11/1/2012 - Present    MEDICAL HOME    Unknown - Present    Chronic kidney disease, stage III (moderate) N18.3 High  7/22/2013 - Present    Diabetic nephropathy (CMS-HCC) E11.21   11/15/2013 - Present    Severe obesity (BMI 35.0-35.9 with comorbidity) (CMS-HCC) E66.01, Z68.35   6/9/2014 - Present    Asthma, mild intermittent J45.20   9/22/2014 - Present    Diabetes mellitus with background retinopathy (CMS-HCC) E11.319 High  4/22/2015 - Present    Diabetic macular edema (CMS-HCC) E11.311 High  4/22/2015 - Present    Diastolic dysfunction I51.9 High  4/22/2015 - Present    Secondary hyperparathyroidism (CMS-HCC) N25.81 High  4/22/2015 - Present    History of TIA (transient ischemic attack) (Chronic) Z86.73   5/15/2015 - Present    PAD (peripheral artery disease) (CMS-HCC) I73.9   1/5/2016 - Present      Health Maintenance        Date Due Completion Dates    IMM DTaP/Tdap/Td Vaccine (1 - Tdap) 11/14/1966 ---    IMM ZOSTER VACCINE 11/14/2007 ---    DIABETES MONOFILAMENT / LE EXAM 4/4/2017 4/4/2016, 1/9/2015, 11/15/2013, 5/13/2013 (Done), 4/17/2012 (Done)    Override on 5/13/2013: Done    Override on 4/17/2012: Done    RETINAL SCREENING 6/6/2017 6/6/2016, 3/14/2016, 2/1/2016, 10/22/2015, 8/19/2015, 7/17/2015, 6/9/2015    A1C  SCREENING 9/24/2017 3/24/2017, 10/5/2016, 6/30/2016, 3/31/2016, 11/16/2015, 8/14/2015, 4/22/2015, 9/24/2014, 5/27/2014, 3/27/2014, 11/13/2013, 5/9/2013, 2/4/2013, 10/23/2012, 7/25/2012, 2/22/2011    IMM PNEUMOCOCCAL 65+ (ADULT) LOW/MEDIUM RISK SERIES (2 of 2 - PPSV23) 11/1/2017 5/9/2016, 11/1/2012    COLON CANCER SCREENING ANNUAL FIT 1/12/2018 1/12/2017, 11/25/2015, 9/30/2014, 2/7/2013    BONE DENSITY 2/22/2018 2/22/2013    FASTING LIPID PROFILE 3/24/2018 3/24/2017, 3/31/2016, 4/22/2015, 9/24/2014, 5/27/2014, 3/27/2014, 11/13/2013, 2/4/2013, 7/25/2012, 3/28/2012 (Done)    Override on 3/28/2012: Done    URINE ACR / MICROALBUMIN 3/24/2018 3/24/2017, 1/6/2017, 6/30/2016, 3/10/2016, 8/14/2015, 4/22/2015, 4/22/2015, 11/3/2014, 9/24/2014, 5/27/2014, 3/27/2014, 11/13/2013, 9/20/2013, 2/4/2013, 11/1/2012, 7/25/2012    SERUM CREATININE 3/24/2018 3/24/2017, 1/6/2017, 6/30/2016, 3/31/2016, 3/10/2016, 8/14/2015, 4/22/2015, 4/22/2015, 11/3/2014, 9/24/2014, 9/24/2014, 5/27/2014, 3/27/2014, 11/13/2013, 9/20/2013, 7/16/2013, 1/3/2013, 12/31/2012, 10/23/2012, 8/16/2012, 8/15/2012, 7/25/2012, 3/28/2012 (Done)    Override on 3/28/2012: Done    MAMMOGRAM 4/19/2018 4/19/2017, 3/24/2017, 12/31/2015, 10/15/2013, 8/1/2012 (Done)    Override on 8/1/2012: Done            Current Immunizations     13-VALENT PCV PREVNAR 5/9/2016    Influenza TIV (IM) 9/18/2015, 11/15/2013  9:00 AM, 11/1/2012    Influenza Vaccine Adult HD 10/7/2016    Influenza Vaccine Quad Inj (Pf) 9/30/2014    Pneumococcal polysaccharide vaccine (PPSV-23) 11/1/2012      Below and/or attached are the medications your provider expects you to take. Review all of your home medications and newly ordered medications with your provider and/or pharmacist. Follow medication instructions as directed by your provider and/or pharmacist. Please keep your medication list with you and share with your provider. Update the information when medications are discontinued, doses are changed, or  new medications (including over-the-counter products) are added; and carry medication information at all times in the event of emergency situations     Allergies:  LISINOPRIL - (reactions not documented)               Medications  Valid as of: April 21, 2017 - 11:16 AM    Generic Name Brand Name Tablet Size Instructions for use    Albuterol Sulfate (Aero Soln) VENTOLIN  (90 BASE) MCG/ACT INHALE TWO PUFFS BY MOUTH EVERY 6 HOURS AS NEEDED FOR SHORTNESS OF BREATH        Albuterol Sulfate (Aero Soln) VENTOLIN  (90 BASE) MCG/ACT INHALE TWO PUFFS BY MOUTH EVERY 6 HOURS AS NEEDED FOR SHORTNESS OF BREATH        Allopurinol (Tab) ZYLOPRIM 100 MG TAKE TWO TABLETS BY MOUTH DAILY        Aspirin (Tab)  MG Take 1 Tab by mouth every 6 hours as needed for Mild Pain.        Atorvastatin Calcium (Tab) LIPITOR 20 MG Take 1 Tab by mouth every day.        Blood Glucose Monitoring Suppl (Misc) Blood Glucose Monitoring Suppl SUPPLIES Contour next EZ monitor kit use TID        Blood Glucose Monitoring Suppl (Misc) Blood Glucose Monitoring Suppl SUPPLIES Test strips order: Test strips for Accucheck Linda meter. Sig: use tid  #100 RF x 3        Cholecalciferol (Tab) cholecalciferol 1000 UNIT Take 1,000 Units by mouth 2 Times a Day.        Clopidogrel Bisulfate (Tab) PLAVIX 75 MG Take 1 Tab by mouth every day.        Clopidogrel Bisulfate (Tab) PLAVIX 75 MG Take 1 Tab by mouth every day.        Colchicine (Tab) COLCRYS 0.6 MG TAKE ONE TABLET BY MOUTH AS NEEDED AT FIRST SIGN OF GOUT, MAY REPEAT IN 1 HOUR IF SYMPTOMS PERSIST.        DiazePAM (Tab) VALIUM 2 MG Take 1 Tab by mouth every 8 hours as needed (spasm).        Doxazosin Mesylate (Tab) CARDURA 2 MG Take 1 Tab by mouth every day.        Furosemide (Tab) LASIX 20 MG Take 1 Tab by mouth every day.        Insulin Aspart (Solution Pen-injector) NOVOLOG 100 UNIT/ML Inject 3-15 Units as instructed 3 times a day before meals.        Insulin Glargine (Solution Pen-injector)  LANTUS 100 UNIT/ML Inject 36 Units as instructed every evening.        Insulin Pen Needle (Misc) Insulin Pen Needle 31G X 5 MM 1 Each by Other route 4 times a day. TEST TID FOR HIGH AND LOW SUGAR LEVEL DX: E 11.65 (USE WITH LANTUS PEN)        Linagliptin (Tab) TRADJENTA 5 MG TAKE ONE TABLET BY MOUTH DAILY        Losartan Potassium (Tab) COZAAR 100 MG Take 1 Tab by mouth every day. TAKE ONE TABLET BY MOUTH DAILY        MetFORMIN HCl (Tab) GLUCOPHAGE 1000 MG Take 1 Tab by mouth 2 times a day, with meals.        MetFORMIN HCl (Tab) GLUCOPHAGE 500 MG Take 500 mg by mouth 2 times a day, with meals.        Metoprolol Succinate (TABLET SR 24 HR) TOPROL XL 50 MG Take 1 Tab by mouth every day.        TraMADol HCl (Tab) ULTRAM 50 MG Take 1 Tab by mouth every 6 hours as needed for Severe Pain.        .                 Medicines prescribed today were sent to:     [facility] #[id number] - [address] AT [address] [address]    Phone: [phone] Fax: [phone]    Open 24 Hours?: No    [facility] - [facility] [address] [place] [address]    [address] [facility] [address]    Phone: [phone] Fax: [phone]    Open 24 Hours?: No      Medication refill instructions:       If your prescription bottle indicates you have medication refills left, it is not necessary to call your provider’s office. Please contact your pharmacy and they will refill your medication.    If your prescription bottle indicates you do not have any refills left, you may request refills at any time through one of the following ways: The online [employer] system (except Urgent Care), by calling your provider’s office, or by asking your pharmacy to contact your provider’s office with a refill request. Medication refills are processed only during regular business hours and may not be available until the next business day. Your provider may request additional information or to have a follow-up  visit with you prior to refilling your medication.   *Please Note: Medication refills are assigned a new Rx number when refilled electronically. Your pharmacy may indicate that no refills were authorized even though a new prescription for the same medication is available at the pharmacy. Please request the medicine by name with the pharmacy before contacting your provider for a refill.        Your To Do List     Future Labs/Procedures Complete By Expires    BASIC METABOLIC PANEL  As directed 4/22/2018      Other Notes About Your Plan     Patient is enrolled in [facility] with Dr Tyler Lynch Access Code: Activation code not generated  Current Bennyt Status: Active

## 2017-04-21 NOTE — PROGRESS NOTES
CC: Follow-up blood work and mammogram due for wellness exam.    HPI:   Abigail presents today with the following.    1. Medicare annual wellness visit, subsequent  Screenings performed below    2. Controlled type 2 diabetes mellitus without complication, without long-term current use of insulin (CMS-HCC)  Diabetes doing well A1c at 6.9 denying hypoglycemia.    3. Diabetes mellitus with background retinopathy (CMS-HCC)  She is followed by ophthalmology denying any changes to vision.    4. Diabetic macular edema (CMS-HCC)  Again no visual changes followed by ophthalmology.    5. Chronic kidney disease, stage III (moderate)  Kidney function slightly worse but BUN up as well likely dehydration last creatinine in the normal range prior to this.    6. Secondary hyperparathyroidism (CMS-HCC)  Secondary to kidney function again clinically doing well    7. History of TIA (transient ischemic attack)  No residual symptoms    8. Essential hypertension  Blood pressure well controlled denying any chest pain or shortness of breath no edema.    9. Dyslipidemia  Cholesterol check found him in descent range.    10. Diabetic nephropathy associated with type 2 diabetes mellitus (CMS-HCC)  Denies any advancement of numbness in her feet and no skin breakdown.    11. Severe obesity (BMI 35.0-35.9 with comorbidity) (CMS-HCC)  Weight has remained stable. Work with her diet.    12. PAD (peripheral artery disease) (CMS-HCC)  Denies any dancing pain with ambulation.    13. Mild intermittent asthma without complication  Breathing is stablein exacerbations.    14. HTN, goal below 130/80  Dan blood pressure in reasonable range on current medications.    15. Microalbuminuria  Microalbumin at 200 maintain on ACE inhibitor.    16. Abnormal mammogram  Recent mammogram showing abnormal lesion she is scheduled for biopsy. Has no questions or concerns.      Depression Screening    Little interest or pleasure in doing things?  0 - not at all  Feeling  down, depressed , or hopeless? 0 - not at all  Trouble falling or staying asleep, or sleeping too much?     Feeling tired or having little energy?     Poor appetite or overeating?     Feeling bad about yourself - or that you are a failure or have let yourself or your family down?    Trouble concentrating on things, such as reading the newspaper or watching television?    Moving or speaking so slowly that other people could have noticed.  Or the opposite - being so fidgety or restless that you have been moving around a lot more than usual?     Thoughts that you would be better off dead, or of hurting yourself?     Patient Health Questionnaire Score:    If depressive symptoms identified deferred to follow up visit unless specifically addressed in assessment and plan.      Screening for Cognitive Impairment    Three Minute Recall (banana, sunrise, fence)  3/3    Draw clock face with all 12 numbers set to the hand to show 10 minures past 11 o'clock  1 5/5  If cognitive concerns identified deferred to follow up visit unless specifically addressed in assessment and plan.    Fall Risk Assessment    Has the patient had two or more falls in the last year or any fall with injury in the last year?  No  If Fall Risk identified deferred to follow up visit unless specifically addressed in assessment and plan.    Safety Assessment    Throw rugs on floor.  Yes  Handrails on all stairs.  No  Good lighting in all hallways.  Yes  Difficulty hearing.  Yes  Patient counseled about all safety risks that were identified.    Functional Assessment ADLs    Are there any barriers preventing you from cooking for yourself or meeting nutritional needs?  No.    Are there any barriers preventing you from driving safely or obtaining transportation?  No.    Are there any barriers preventing you from using a telephone or calling for help?  No.    Are there any barriers preventing you from shopping?  No.    Are there any barriers preventing you from  taking care of your own finances?  No.    Are there any barriers preventing you from managing your medications?  No.    Are currently engaing any exercise or physical activity?  No.       Health Maintenance Summary                IMM DTaP/Tdap/Td Vaccine Overdue 11/14/1966     IMM ZOSTER VACCINE Overdue 11/14/2007     DIABETES MONOFILAMENT / LE EXAM Overdue 4/4/2017      Done 4/4/2016 AMB DIABETIC MONOFILAMENT LOWER EXTREMITY EXAM     Patient has more history with this topic...    RETINAL SCREENING Next Due 6/6/2017      Done 6/6/2016 AMB REFERRAL FOR RETINAL SCREENING EXAM     Patient has more history with this topic...    A1C SCREENING Next Due 9/24/2017      Done 3/24/2017 HEMOGLOBIN A1C (A)     Patient has more history with this topic...    IMM PNEUMOCOCCAL 65+ (ADULT) LOW/MEDIUM RISK SERIES Next Due 11/1/2017      Done 5/9/2016 Imm Admin: Pneumococcal Conjugate Vaccine (Prevnar/PCV-13)     Patient has more history with this topic...    COLON CANCER SCREENING ANNUAL FIT Next Due 1/12/2018      Done 1/12/2017 OCCULT BLOOD FECES IMMUNOASSAY     Patient has more history with this topic...    BONE DENSITY Next Due 2/22/2018      Done 2/22/2013 DS-BONE DENSITY STUDY (DEXA)    FASTING LIPID PROFILE Next Due 3/24/2018      Done 3/24/2017 LIPID PROFILE (A)     Patient has more history with this topic...    URINE ACR / MICROALBUMIN Next Due 3/24/2018      Done 3/24/2017 MICROALBUMIN CREAT RATIO URINE (A)     Patient has more history with this topic...    SERUM CREATININE Next Due 3/24/2018      Done 3/24/2017 COMP METABOLIC PANEL (A)     Patient has more history with this topic...    MAMMOGRAM Next Due 4/19/2018      Done 4/19/2017 MA-DIAGNOSTIC MAMMO-UNILAT     Patient has more history with this topic...    Annual Wellness Visit Next Due 4/22/2018      Done 4/21/2017 Visit Dx: Medicare annual wellness visit, subsequent     Patient has more history with this topic...          Patient Care Team:  Alessandro Scott M.D. as  PCP - General (Internal Medicine)  Jamel Gonzalez M.D. as Consulting Physician (Nephrology)  Wm Beltran M.D. as Consulting Physician (Ophthalmology)      Patient Active Problem List    Diagnosis Date Noted   • Diabetes mellitus with background retinopathy (CMS-HCC) 04/22/2015     Priority: High   • Diabetic macular edema (CMS-HCC) 04/22/2015     Priority: High   • Diastolic dysfunction 04/22/2015     Priority: High   • Secondary hyperparathyroidism (CMS-HCC) 04/22/2015     Priority: High   • Chronic kidney disease, stage III (moderate) 07/22/2013     Priority: High   • Diabetes type 2, controlled (CMS-HCC) 02/29/2012     Priority: High   • PAD (peripheral artery disease) (CMS-HCC) 01/05/2016   • History of TIA (transient ischemic attack) 05/15/2015   • Asthma, mild intermittent 09/22/2014   • Severe obesity (BMI 35.0-35.9 with comorbidity) (CMS-HCC) 06/09/2014   • Diabetic nephropathy (CMS-HCC) 11/15/2013   • MEDICAL HOME    • Microalbuminuria 11/01/2012   • Systolic murmur 08/16/2012   • Gout, arthropathy 04/17/2012   • Dyslipidemia 04/17/2012   • Essential hypertension 02/29/2012       Current Outpatient Prescriptions   Medication Sig Dispense Refill   • metformin (GLUCOPHAGE) 500 MG Tab Take 500 mg by mouth 2 times a day, with meals.     • losartan (COZAAR) 100 MG Tab Take 1 Tab by mouth every day. TAKE ONE TABLET BY MOUTH DAILY 90 Tab 3   • insulin glargine (LANTUS SOLOSTAR) 100 UNIT/ML Solution Pen-injector injection Inject 36 Units as instructed every evening. 5 PEN 3   • Insulin Pen Needle (B-D UF III MINI PEN NEEDLES) 31G X 5 MM Misc 1 Each by Other route 4 times a day. TEST TID FOR HIGH AND LOW SUGAR LEVEL DX: E 11.65 (USE WITH LANTUS PEN) 100 Each 11   • NOVOLOG, insulin aspart, (NOVOLOG FLEXPEN) 100 UNIT/ML Solution Pen-injector injection Inject 3-15 Units as instructed 3 times a day before meals. 5 PEN 3   • Blood Glucose Monitoring Suppl SUPPLIES Misc Test strips order: Test strips for  Accucheck Linda meter. Sig: use tid  #100 RF x 3 100 Each 3   • Blood Glucose Monitoring Suppl SUPPLIES Misc Contour next EZ monitor kit use  Each 11   • allopurinol (ZYLOPRIM) 100 MG Tab TAKE TWO TABLETS BY MOUTH DAILY 60 Tab 7   • clopidogrel (PLAVIX) 75 MG Tab Take 1 Tab by mouth every day. 30 Tab 6   • doxazosin (CARDURA) 2 MG Tab Take 1 Tab by mouth every day. 30 Tab 11   • metoprolol SR (TOPROL XL) 50 MG TABLET SR 24 HR Take 1 Tab by mouth every day. 90 Tab 4   • vitamin D (CHOLECALCIFEROL) 1000 UNIT Tab Take 1,000 Units by mouth 2 Times a Day.     • linagliptin (TRADJENTA) 5 MG Tab tablet TAKE ONE TABLET BY MOUTH DAILY 30 Tab 11   • atorvastatin (LIPITOR) 20 MG Tab Take 1 Tab by mouth every day. 30 Tab 11   • VENTOLIN  (90 BASE) MCG/ACT Aero Soln inhalation aerosol INHALE TWO PUFFS BY MOUTH EVERY 6 HOURS AS NEEDED FOR SHORTNESS OF BREATH 1 Inhaler 6   • COLCRYS 0.6 MG TABS TAKE ONE TABLET BY MOUTH AS NEEDED AT FIRST SIGN OF GOUT, MAY REPEAT IN 1 HOUR IF SYMPTOMS PERSIST. 30 Tab 5   • aspirin (ASA) 325 MG TABS Take 1 Tab by mouth every 6 hours as needed for Mild Pain. 30 Each 6   • clopidogrel (PLAVIX) 75 MG Tab Take 1 Tab by mouth every day. (Patient not taking: Reported on 4/21/2017) 90 Tab 3   • VENTOLIN  (90 BASE) MCG/ACT Aero Soln inhalation aerosol INHALE TWO PUFFS BY MOUTH EVERY 6 HOURS AS NEEDED FOR SHORTNESS OF BREATH (Patient not taking: Reported on 4/21/2017) 18 Inhaler 2   • tramadol (ULTRAM) 50 MG Tab Take 1 Tab by mouth every 6 hours as needed for Severe Pain. (Patient not taking: Reported on 4/21/2017) 12 Tab 0   • diazepam (VALIUM) 2 MG Tab Take 1 Tab by mouth every 8 hours as needed (spasm). (Patient not taking: Reported on 4/21/2017) 9 Tab 0   • metformin (GLUCOPHAGE) 1000 MG tablet Take 1 Tab by mouth 2 times a day, with meals. (Patient not taking: Reported on 4/21/2017) 60 Tab 11   • furosemide (LASIX) 20 MG TABS Take 1 Tab by mouth every day. (Patient not taking:  "Reported on 4/21/2017) 30 Each 11     No current facility-administered medications for this visit.         Allergies as of 04/21/2017 - Vishal as Reviewed 04/21/2017   Allergen Reaction Noted   • Lisinopril  04/17/2012        ROS: As per HPI.    /70 mmHg  Pulse 66  Temp(Src) 36.1 °C (96.9 °F)  Resp 16  Ht 1.575 m (5' 2.01\")  Wt 96.072 kg (211 lb 12.8 oz)  BMI 38.73 kg/m2  SpO2 95%    Physical Exam:  Gen:         Alert and oriented, No apparent distress.  Neck:        No Lymphadenopathy or Bruits.  Lungs:     Clear to auscultation bilaterally  CV:          Regular rate and rhythm. No murmurs, rubs or gallops.  Abd:         Soft non tender, non distended. Normal active bowel sounds.  No  Hepatosplenomegaly, No pulsatile masses.                   Ext:          No clubbing, cyanosis, edema.      Assessment and Plan.   71 y.o. female with the following issues.    1. Medicare annual wellness visit, subsequent  Discussed healthy lifestyle habits as well as screening regimens. Information given on advanced directives    - Annual Wellness Visit - Includes PPPS Subsequent ()    2. Controlled type 2 diabetes mellitus without complication, without long-term current use of insulin (CMS-HCC)  Currently well controlled no changes. Discussed diet and exercise recheck A1c in 6 months. Reminded about yearly eye exam.  - Annual Wellness Visit - Includes PPPS Subsequent ()  - BASIC METABOLIC PANEL; Future  - Diabetic Monofilament Lower Extremity Exam    3. Diabetes mellitus with background retinopathy (CMS-HCC)  Follow along with ophthalmology  - metformin (GLUCOPHAGE) 500 MG Tab; Take 500 mg by mouth 2 times a day, with meals.  - Annual Wellness Visit - Includes PPPS Subsequent ()    4. Diabetic macular edema (CMS-HCC)  And follow with ophthalmology  - metformin (GLUCOPHAGE) 500 MG Tab; Take 500 mg by mouth 2 times a day, with meals.  - Annual Wellness Visit - Includes PPPS Subsequent ()    5. Chronic " kidney disease, stage III (moderate)  Kidney function slightly awful recheck in 3 months continues to rise we'll stop metformin.  - metformin (GLUCOPHAGE) 500 MG Tab; Take 500 mg by mouth 2 times a day, with meals.  - Annual Wellness Visit - Includes PPPS Subsequent ()    6. Secondary hyperparathyroidism (CMS-HCC)  Continue monitor calcium.    - Annual Wellness Visit - Includes PPPS Subsequent ()    7. History of TIA (transient ischemic attack)  Continue risk reduction    - Annual Wellness Visit - Includes PPPS Subsequent ()    8. Essential hypertension  Currently well controlled, Discuss diet, exercise and salt restriction.    - Annual Wellness Visit - Includes PPPS Subsequent ()    9. Dyslipidemia  Lipids currently well controlled. Discussed continued diet and exercise recheck 6 months to 1 year.    - Annual Wellness Visit - Includes PPPS Subsequent ()    10. Diabetic nephropathy associated with type 2 diabetes mellitus (CMS-HCC)  Continue glycemic control reminded about daily foot checks.  - metformin (GLUCOPHAGE) 500 MG Tab; Take 500 mg by mouth 2 times a day, with meals.  - Annual Wellness Visit - Includes PPPS Subsequent ()    11. Severe obesity (BMI 35.0-35.9 with comorbidity) (CMS-HCC)  Discussed diet exercise and weight loss strategies. Have set a goal for 15 pounds in 3 months and will followup at that time.    - Annual Wellness Visit - Includes PPPS Subsequent ()    12. PAD (peripheral artery disease) (CMS-HCC)  Clinical stable no change therapy  =  - Annual Wellness Visit - Includes PPPS Subsequent ()    13. Mild intermittent asthma without complication  Clinically stable no change therapy    - Annual Wellness Visit - Includes PPPS Subsequent ()    14. . Microalbuminuria  Continue ACE inhibitor    16. Abnormal mammogram  Plans proceed with biopsy will await results.

## 2017-04-26 ENCOUNTER — TELEPHONE (OUTPATIENT)
Dept: RADIOLOGY | Facility: MEDICAL CENTER | Age: 72
End: 2017-04-26

## 2017-05-02 ENCOUNTER — HOSPITAL ENCOUNTER (OUTPATIENT)
Dept: RADIOLOGY | Facility: MEDICAL CENTER | Age: 72
End: 2017-05-02
Attending: INTERNAL MEDICINE
Payer: MEDICARE

## 2017-05-02 DIAGNOSIS — R92.8 ABNORMAL MAMMOGRAM: ICD-10-CM

## 2017-05-23 ENCOUNTER — TELEPHONE (OUTPATIENT)
Dept: MEDICAL GROUP | Facility: MEDICAL CENTER | Age: 72
End: 2017-05-23

## 2017-05-23 DIAGNOSIS — B07.0 PLANTAR WART OF LEFT FOOT: ICD-10-CM

## 2017-05-23 NOTE — TELEPHONE ENCOUNTER
1. Caller Name: Abiagil                                         Call Back Number: N/A      Patient approves a detailed voicemail message: N\A    2. SPECIFIC Action To Be Taken: Referral pending, please sign.    3. Diagnosis/Clinical Reason for Request: Plantar Wart L. foot    4. Specialty & Provider Name/Lab/Imaging Location: podiatrist    5. Is appointment scheduled for requested order/referral: no    Patient informed they will receive a return phone call from the office ONLY if there are any questions before processing their request. Advised to call back if they haven't received a call from the referral department in 5 days.

## 2017-05-30 DIAGNOSIS — E78.5 DYSLIPIDEMIA: ICD-10-CM

## 2017-05-30 RX ORDER — ATORVASTATIN CALCIUM 20 MG/1
20 TABLET, FILM COATED ORAL DAILY
Qty: 30 TAB | Refills: 11 | Status: SHIPPED | OUTPATIENT
Start: 2017-05-30 | End: 2017-12-08 | Stop reason: SDUPTHER

## 2017-06-30 ENCOUNTER — TELEPHONE (OUTPATIENT)
Dept: MEDICAL GROUP | Facility: MEDICAL CENTER | Age: 72
End: 2017-06-30

## 2017-06-30 NOTE — TELEPHONE ENCOUNTER
Future Appointments       Provider Department Center    7/10/2017 10:40 AM Alessandro Scott M.D.; [facility] RN [facility] [address]    7/14/2017 9:00 AM Jamel Gonzalez M.D. [facility] [address] [place] [address] PATIENT PRE-VISIT PLANNING     Note: Patient will not be contacted if there is no indication to call.     1.  Reviewed note from last office visit with PCP and/or other med group provider: Yes    2.  If any orders were placed at last visit, do we have Results/Consult Notes?        •  Labs - Labs were not ordered at last office visit.       •  Imaging - Imaging was not ordered at last office visit.       •  Referrals - No referrals were ordered at last office visit.    3.  Immunizations were updated in [facility] using WebIZ?: Yes       •  Web Iz Recommendations: HEPATITIS A  TDAP ZOSTAVAX (Shingles)    4.  Patient is due for the following Health Maintenance Topics:   Health Maintenance Due   Topic Date Due   • IMM DTaP/Tdap/Td Vaccine (1 - Tdap) 11/14/1966   • IMM ZOSTER VACCINE  11/14/2007   • RETINAL SCREENING  06/06/2017           5.  Patient was not informed to arrive 15 min prior to their scheduled appointment and bring in their medication bottles.

## 2017-07-05 ENCOUNTER — TELEPHONE (OUTPATIENT)
Dept: MEDICAL GROUP | Facility: MEDICAL CENTER | Age: 72
End: 2017-07-05

## 2017-07-05 NOTE — TELEPHONE ENCOUNTER
VOICEMAIL  1. Caller Name: Abigail                      Call Back Number: [phone]    2. Message: Patient LVM requesting an order for an A1c. Patient will be going to lab to do orders for her nephrologist and would like to have it done at the same time.    3. Patient approves office to leave a detailed voicemail/[employer] message: N\A

## 2017-07-10 ENCOUNTER — OFFICE VISIT (OUTPATIENT)
Dept: MEDICAL GROUP | Facility: MEDICAL CENTER | Age: 72
End: 2017-07-10
Payer: MEDICARE

## 2017-07-10 VITALS
DIASTOLIC BLOOD PRESSURE: 70 MMHG | WEIGHT: 208 LBS | HEIGHT: 62 IN | HEART RATE: 69 BPM | TEMPERATURE: 96.4 F | OXYGEN SATURATION: 92 % | BODY MASS INDEX: 38.28 KG/M2 | SYSTOLIC BLOOD PRESSURE: 140 MMHG

## 2017-07-10 DIAGNOSIS — E11.3299 DIABETES MELLITUS WITH BACKGROUND RETINOPATHY (HCC): ICD-10-CM

## 2017-07-10 DIAGNOSIS — Z23 NEED FOR SHINGLES VACCINE: ICD-10-CM

## 2017-07-10 LAB
HBA1C MFR BLD: 6.4 % (ref ?–5.8)
INT CON NEG: NORMAL
INT CON POS: NORMAL

## 2017-07-10 PROCEDURE — 99215 OFFICE O/P EST HI 40 MIN: CPT | Mod: 25 | Performed by: INTERNAL MEDICINE

## 2017-07-10 PROCEDURE — 83036 HEMOGLOBIN GLYCOSYLATED A1C: CPT | Performed by: INTERNAL MEDICINE

## 2017-07-10 PROCEDURE — 90736 HZV VACCINE LIVE SUBQ: CPT | Performed by: INTERNAL MEDICINE

## 2017-07-10 PROCEDURE — 90471 IMMUNIZATION ADMIN: CPT | Performed by: INTERNAL MEDICINE

## 2017-07-10 NOTE — PROGRESS NOTES
RN-SHERWINE Note  Type 2 Diabetes  Subjective:     Health changes since last visit/interval Hx: General Health good.  Having some low blood sugars during the night and midafternoon.    Medications (including changes made today)  Current Outpatient Prescriptions   Medication Sig Dispense Refill   • metformin (GLUCOPHAGE) 500 MG Tab Take 500 mg by mouth 2 times a day, with meals.     • atorvastatin (LIPITOR) 20 MG Tab Take 1 Tab by mouth every day. 30 Tab 11   • losartan (COZAAR) 100 MG Tab Take 1 Tab by mouth every day. TAKE ONE TABLET BY MOUTH DAILY 90 Tab 3   • insulin glargine (LANTUS SOLOSTAR) 100 UNIT/ML Solution Pen-injector injection Inject 36 Units as instructed every evening. 5 PEN 3   • Insulin Pen Needle (B-D UF III MINI PEN NEEDLES) 31G X 5 MM Misc 1 Each by Other route 4 times a day. TEST TID FOR HIGH AND LOW SUGAR LEVEL DX: E 11.65 (USE WITH LANTUS PEN) 100 Each 11   • NOVOLOG, insulin aspart, (NOVOLOG FLEXPEN) 100 UNIT/ML Solution Pen-injector injection Inject 3-15 Units as instructed 3 times a day before meals. 5 PEN 3   • allopurinol (ZYLOPRIM) 100 MG Tab TAKE TWO TABLETS BY MOUTH DAILY 60 Tab 7   • clopidogrel (PLAVIX) 75 MG Tab Take 1 Tab by mouth every day. 30 Tab 6   • doxazosin (CARDURA) 2 MG Tab Take 1 Tab by mouth every day. 30 Tab 11   • metoprolol SR (TOPROL XL) 50 MG TABLET SR 24 HR Take 1 Tab by mouth every day. 90 Tab 4   • vitamin D (CHOLECALCIFEROL) 1000 UNIT Tab Take 1,000 Units by mouth 2 Times a Day.     • linagliptin (TRADJENTA) 5 MG Tab tablet TAKE ONE TABLET BY MOUTH DAILY 30 Tab 11   • aspirin (ASA) 325 MG TABS Take 1 Tab by mouth every 6 hours as needed for Mild Pain. 30 Each 6   • Blood Glucose Monitoring Suppl SUPPLIES Misc Test strips order: Test strips for Accucheck Linda meter. Sig: use tid  #100 RF x 3 100 Each 3   • VENTOLIN  (90 BASE) MCG/ACT Aero Soln inhalation aerosol INHALE TWO PUFFS BY MOUTH EVERY 6 HOURS AS NEEDED FOR SHORTNESS OF BREATH 1 Inhaler 6   • COLCRYS  0.6 MG TABS TAKE ONE TABLET BY MOUTH AS NEEDED AT FIRST SIGN OF GOUT, MAY REPEAT IN 1 HOUR IF SYMPTOMS PERSIST. 30 Tab 5     No current facility-administered medications for this visit.         Taking daily ASA: Yes  Taking above medications as prescribed: Yes   Patient Denies side effects of medication.    Exercise: Walking throughout the day  Diet: Breakfast with cereal and fruit.  Lunch is chicken salad sandwiches.  Dinner     Health Maintenance:   Health Maintenance Topics with due status: Overdue       Topic Date Due    IMM DTaP/Tdap/Td Vaccine 11[birth year]14/1966    IMM ZOSTER VACCINE 11/14/2007    RETINAL SCREENING 06/06/2017         DM:   Last A1c:   Lab Results   Component Value Date/Time    GLYCOHEMOGLOBIN 6.4 07/10/2017 11:02 AM      A1c goal: < 7    Glucose monitoring frequency: Testing blood sugars 3 times daily  trend:   Hypoglycemic episodes: yes - during the night and midafternoon     Last Retinal Exam: 6/28/17 Provider: [place] J[facility] Foot Exam: yes  Routine Dental Exams: yes    Lab Results   Component Value Date/Time    MICRO ALB CREAT RATIO 241* 03/24/2017 06:30 AM    MICROALBUMIN, URINE RANDOM 15.7 03/24/2017 06:30 AM        ACR Albumin/Creatinine Ratio goal <30     Diabetic complications: retinopathy    HTN:   Blood pressure goal <140/<80 .   Currently Rx ACE/ARB: Yes    Dyslipidemia:    Lab Results   Component Value Date/Time    CHOLESTEROL, 03/24/2017 06:31 AM    LDL 59 03/24/2017 06:31 AM    HDL 35* 03/24/2017 06:31 AM    TRIGLYCERIDES 182* 03/24/2017 06:31 AM       Lab Results   Component Value Date/Time    SODIUM 137 03/24/2017 06:31 AM    POTASSIUM 4.5 03/24/2017 06:31 AM    CHLORIDE 104 03/24/2017 06:31 AM    CO2 27 03/24/2017 06:31 AM    GLUCOSE 142* 03/24/2017 06:31 AM    BUN 30* 03/24/2017 06:31 AM    CREATININE 1.51* 03/24/2017 06:31 AM     Lab Results   Component Value Date/Time    ALKALINE PHOSPHATASE 85 03/24/2017 06:31 AM    AST(SGOT) 13 03/24/2017 06:31 AM    ALT(SGPT) 14  03/24/2017 06:31 AM    TOTAL BILIRUBIN 0.5 03/24/2017 06:31 AM        Currently Rx Statin: Yes      She  reports that she has never smoked. She has never used smokeless tobacco.    Objective:     Exam:  Monofilament: done  Monofilament testing with a 10 gram force: sensation intact: intact bilaterally  Visual Inspection: Feet without maceration, ulcers, fissures.  Pedal pulses: intact bilaterally      Plan:     - Diabetic diet discussed in detail-plate method.  - Home glucose monitoring.  - She will test and log.    - She will walk for 20-30 minutes daily.  - Reviewed medications and advised to take metformin after meals to decrease   G.I.upset.   - Discussed importance of immunizations and yearly eye exams.   - Encouraged patient to attend diabetes education program.   -Educational material distributed.   - Advised daily foot exams. Educated on signs of infection.       Recommended medication changes: Having some low blood sugars.  We will decrease her lantus down to 33 units.  She will be having lab work done on Wednesday to recheck creatine.

## 2017-07-10 NOTE — MR AVS SNAPSHOT
"        Abigail Silvestreonnell   7/10/2017 10:40 AM   Office Visit   MRN: [id number]    Department:  [address] [place] [address]   Dept Phone:  [phone]    Description:  Female : 1945   Provider:  Alessandro Scott M.D.; DIABETES RN           Reason for Visit     Diabetes           Allergies as of 7/10/2017     Allergen Noted Reactions    Lisinopril 2012       \"cough\"      You were diagnosed with     Diabetes mellitus with background retinopathy (CMS-HCC)   [3096365]       Need for shingles vaccine   [696430]         Vital Signs     Blood Pressure Pulse Temperature Height Weight Body Mass Index    140/70 mmHg 69 35.8 °C (96.4 °F) 1.575 m (5' 2\") 94.348 kg (208 lb) 38.03 kg/m2    Oxygen Saturation Smoking Status                92% Never Smoker           Basic Information     Date Of Birth Sex Race Ethnicity Preferred Language    1945 Female Unknown Non- English      Your appointments     2017  9:00 AM   Adult Draw/Collection with [facility] (--)    [address] [place] [address]   [phone]            2017  9:00 AM   Follow Up Visit with Jamel Gonzalez M.D.   [facility] ([address])    [address] [place] [address], [address]  [place] [id number] [phone]           You will be receiving a confirmation call a few days before your appointment from our automated call confirmation system.            Devon 10, 2018  1:00 PM   Established Patient with Alessandro Scott M.D.   [facility] [address]    [address]  [place] [id number] [phone]           You will be receiving a confirmation call a few days before your appointment from our automated call confirmation system.              Problem List              ICD-10-CM Priority Class Noted - Resolved    Diabetes type 2, controlled (CMS-[facility]) E11.9 High  2012 - Present    Essential hypertension I10   2012 - Present    Gout, arthropathy (Chronic) " M10.9   4/17/2012 - Present    Dyslipidemia E78.5   4/17/2012 - Present    Systolic murmur R01.1   8/16/2012 - Present    Microalbuminuria R80.9   11/1/2012 - Present    MEDICAL HOME    Unknown - Present    Chronic kidney disease, stage III (moderate) N18.3 High  7/22/2013 - Present    Diabetic nephropathy (CMS-HCC) E11.21   11/15/2013 - Present    Severe obesity (BMI 35.0-35.9 with comorbidity) (CMS-HCC) E66.01, Z68.35   6/9/2014 - Present    Asthma, mild intermittent J45.20   9/22/2014 - Present    Diabetes mellitus with background retinopathy (CMS-HCC) E11.319 High  4/22/2015 - Present    Diabetic macular edema (CMS-HCC) E11.311 High  4/22/2015 - Present    Diastolic dysfunction I51.9 High  4/22/2015 - Present    Secondary hyperparathyroidism (CMS-HCC) N25.81 High  4/22/2015 - Present    History of TIA (transient ischemic attack) (Chronic) Z86.73   5/15/2015 - Present    PAD (peripheral artery disease) (CMS-HCC) I73.9   1/5/2016 - Present      Health Maintenance        Date Due Completion Dates    IMM DTaP/Tdap/Td Vaccine (1 - Tdap) 11/14/1966 ---    IMM ZOSTER VACCINE 11/14/2007 ---    IMM INFLUENZA (1) 9/1/2017 10/7/2016, 9/18/2015, 9/30/2014, 11/15/2013, 11/1/2012    A1C SCREENING 9/24/2017 3/24/2017, 10/5/2016, 6/30/2016, 3/31/2016, 11/16/2015, 8/14/2015, 4/22/2015, 9/24/2014, 5/27/2014, 3/27/2014, 11/13/2013, 5/9/2013, 2/4/2013, 10/23/2012, 7/25/2012, 2/22/2011    IMM PNEUMOCOCCAL 65+ (ADULT) LOW/MEDIUM RISK SERIES (2 of 2 - PPSV23) 11/1/2017 5/9/2016, 11/1/2012    COLON CANCER SCREENING ANNUAL FIT 1/12/2018 1/12/2017, 11/25/2015, 9/30/2014, 2/7/2013    BONE DENSITY 2/22/2018 2/22/2013    FASTING LIPID PROFILE 3/24/2018 3/24/2017, 3/31/2016, 4/22/2015, 9/24/2014, 5/27/2014, 3/27/2014, 11/13/2013, 2/4/2013, 7/25/2012, 3/28/2012 (Done)    Override on 3/28/2012: Done    URINE ACR / MICROALBUMIN 3/24/2018 3/24/2017, 1/6/2017, 6/30/2016, 3/10/2016, 8/14/2015, 4/22/2015, 4/22/2015, 11/3/2014, 9/24/2014, 5/27/2014,  3/27/2014, 11/13/2013, 9/20/2013, 2/4/2013, 1/3/2013, 11/1/2012, 7/25/2012    SERUM CREATININE 3/24/2018 3/24/2017, 1/6/2017, 6/30/2016, 3/31/2016, 3/10/2016, 8/14/2015, 4/22/2015, 4/22/2015, 11/3/2014, 9/24/2014, 9/24/2014, 5/27/2014, 3/27/2014, 11/13/2013, 9/20/2013, 7/16/2013, 1/3/2013, 12/31/2012, 10/23/2012, 8/16/2012, 8/15/2012, 7/25/2012, 3/28/2012 (Done)    Override on 3/28/2012: Done    RETINAL SCREENING 6/28/2018 6/28/2017 (Done), 6/6/2016, 3/14/2016, 2/1/2016, 10/22/2015, 8/19/2015, 7/17/2015, 6/9/2015    Override on 6/28/2017: Done    DIABETES MONOFILAMENT / LE EXAM 7/10/2018 7/10/2017, 4/21/2017, 4/4/2016, 1/9/2015, 11/15/2013, 5/13/2013 (Done), 4/17/2012 (Done)    Override on 5/13/2013: Done    Override on 4/17/2012: Done    MAMMOGRAM 4/19/2023 4/19/2017, 3/24/2017, 12/31/2015, 10/15/2013, 8/1/2012 (Done)    Override on 8/1/2012: Done            Results     POCT Hemoglobin A1C      Component    Glycohemoglobin    6.4    Comment:     lot # [id number] expir. 2/19    Internal Control Negative    Internal Control Positive                        Current Immunizations     13-VALENT PCV PREVNAR 5/9/2016    Influenza TIV (IM) 9/18/2015, 11/15/2013  9:00 AM, 11/1/2012    Influenza Vaccine Adult HD 10/7/2016    Influenza Vaccine Quad Inj (Pf) 9/30/2014    Pneumococcal polysaccharide vaccine (PPSV-23) 11/1/2012    SHINGLES VACCINE  Incomplete      Below and/or attached are the medications your provider expects you to take. Review all of your home medications and newly ordered medications with your provider and/or pharmacist. Follow medication instructions as directed by your provider and/or pharmacist. Please keep your medication list with you and share with your provider. Update the information when medications are discontinued, doses are changed, or new medications (including over-the-counter products) are added; and carry medication information at all times in the event of emergency situations     Allergies:   LISINOPRIL - (reactions not documented)               Medications  Valid as of: July 10, 2017 - 11:21 AM    Generic Name Brand Name Tablet Size Instructions for use    Albuterol Sulfate (Aero Soln) VENTOLIN  (90 BASE) MCG/ACT INHALE TWO PUFFS BY MOUTH EVERY 6 HOURS AS NEEDED FOR SHORTNESS OF BREATH        Allopurinol (Tab) ZYLOPRIM 100 MG TAKE TWO TABLETS BY MOUTH DAILY        Aspirin (Tab)  MG Take 1 Tab by mouth every 6 hours as needed for Mild Pain.        Atorvastatin Calcium (Tab) LIPITOR 20 MG Take 1 Tab by mouth every day.        Blood Glucose Monitoring Suppl (Misc) Blood Glucose Monitoring Suppl SUPPLIES Test strips order: Test strips for Accucheck Linda meter. Sig: use tid  #100 RF x 3        Cholecalciferol (Tab) cholecalciferol 1000 UNIT Take 1,000 Units by mouth 2 Times a Day.        Clopidogrel Bisulfate (Tab) PLAVIX 75 MG Take 1 Tab by mouth every day.        Colchicine (Tab) COLCRYS 0.6 MG TAKE ONE TABLET BY MOUTH AS NEEDED AT FIRST SIGN OF GOUT, MAY REPEAT IN 1 HOUR IF SYMPTOMS PERSIST.        Doxazosin Mesylate (Tab) CARDURA 2 MG Take 1 Tab by mouth every day.        Insulin Aspart (Solution Pen-injector) NOVOLOG 100 UNIT/ML Inject 3-15 Units as instructed 3 times a day before meals.        Insulin Glargine (Solution Pen-injector) LANTUS 100 UNIT/ML Inject 33 Units as instructed every evening.        Insulin Pen Needle (Misc) Insulin Pen Needle 31G X 5 MM 1 Each by Other route 4 times a day. TEST TID FOR HIGH AND LOW SUGAR LEVEL DX: E 11.65 (USE WITH LANTUS PEN)        Linagliptin (Tab) TRADJENTA 5 MG TAKE ONE TABLET BY MOUTH DAILY        Losartan Potassium (Tab) COZAAR 100 MG Take 1 Tab by mouth every day. TAKE ONE TABLET BY MOUTH DAILY        MetFORMIN HCl (Tab) GLUCOPHAGE 500 MG Take 500 mg by mouth 2 times a day, with meals.        Metoprolol Succinate (TABLET SR 24 HR) TOPROL XL 50 MG Take 1 Tab by mouth every day.        .                 Medicines prescribed today were sent to:      [facility] #[id number] - [place], [place] - [address]    [address] [facility] [place] [address]    Phone: [phone] Fax: [phone]    Open 24 Hours?: No    [facility] - [place], [place] - [address]    [address] [facility] [address]    Phone: [phone] Fax: [phone]    Open 24 Hours?: No      Medication refill instructions:       If your prescription bottle indicates you have medication refills left, it is not necessary to call your provider’s office. Please contact your pharmacy and they will refill your medication.    If your prescription bottle indicates you do not have any refills left, you may request refills at any time through one of the following ways: The online [employer] system (except Urgent Care), by calling your provider’s office, or by asking your pharmacy to contact your provider’s office with a refill request. Medication refills are processed only during regular business hours and may not be available until the next business day. Your provider may request additional information or to have a follow-up visit with you prior to refilling your medication.   *Please Note: Medication refills are assigned a new Rx number when refilled electronically. Your pharmacy may indicate that no refills were authorized even though a new prescription for the same medication is available at the pharmacy. Please request the medicine by name with the pharmacy before contacting your provider for a refill.        Other Notes About Your Plan     Patient is enrolled in [facility] with Dr Tyler Lynch Access Code: Activation code not generated  [facility] Status: Active

## 2017-07-10 NOTE — PROGRESS NOTES
CC: Shared visit diabetic nurse    Diabetic nurses note accompanying this visit was reviewed prior to seeing the patient.    HPI:   Abigail presents today with the following.    1. Diabetes mellitus with background retinopathy (CMS-HCC)  Presents A1c in office has improved down to 6.4. She is complaining of events of hypoglycemia. She did have some mild renal insufficiency at last visit a creatinine of 1.5. She does have a recheck this week and is following up with nephrology. She is maintained on low-dose metformin. Overall she's doing quite well trying to watch her diet and exercise should know she can do better. She denying any chest pain or shortness of breath no edema.    2. Need for shingles vaccine        Patient Active Problem List    Diagnosis Date Noted   • Diabetes mellitus with background retinopathy (CMS-HCC) 04/22/2015     Priority: High   • Diabetic macular edema (CMS-HCC) 04/22/2015     Priority: High   • Diastolic dysfunction 04/22/2015     Priority: High   • Secondary hyperparathyroidism (CMS-HCC) 04/22/2015     Priority: High   • Chronic kidney disease, stage III (moderate) 07/22/2013     Priority: High   • Diabetes type 2, controlled (CMS-HCC) 02/29/2012     Priority: High   • PAD (peripheral artery disease) (CMS-HCC) 01/05/2016   • History of TIA (transient ischemic attack) 05/15/2015   • Asthma, mild intermittent 09/22/2014   • Severe obesity (BMI 35.0-35.9 with comorbidity) (CMS-HCC) 06/09/2014   • Diabetic nephropathy (CMS-HCC) 11/15/2013   • MEDICAL HOME    • Microalbuminuria 11/01/2012   • Systolic murmur 08/16/2012   • Gout, arthropathy 04/17/2012   • Dyslipidemia 04/17/2012   • Essential hypertension 02/29/2012       Current Outpatient Prescriptions   Medication Sig Dispense Refill   • metformin (GLUCOPHAGE) 500 MG Tab Take 500 mg by mouth 2 times a day, with meals.     • insulin glargine (LANTUS SOLOSTAR) 100 UNIT/ML Solution Pen-injector injection Inject 33 Units as instructed every  "evening. 5 PEN 3   • atorvastatin (LIPITOR) 20 MG Tab Take 1 Tab by mouth every day. 30 Tab 11   • losartan (COZAAR) 100 MG Tab Take 1 Tab by mouth every day. TAKE ONE TABLET BY MOUTH DAILY 90 Tab 3   • Insulin Pen Needle (B-D UF III MINI PEN NEEDLES) 31G X 5 MM Misc 1 Each by Other route 4 times a day. TEST TID FOR HIGH AND LOW SUGAR LEVEL DX: E 11.65 (USE WITH LANTUS PEN) 100 Each 11   • NOVOLOG, insulin aspart, (NOVOLOG FLEXPEN) 100 UNIT/ML Solution Pen-injector injection Inject 3-15 Units as instructed 3 times a day before meals. 5 PEN 3   • allopurinol (ZYLOPRIM) 100 MG Tab TAKE TWO TABLETS BY MOUTH DAILY 60 Tab 7   • clopidogrel (PLAVIX) 75 MG Tab Take 1 Tab by mouth every day. 30 Tab 6   • doxazosin (CARDURA) 2 MG Tab Take 1 Tab by mouth every day. 30 Tab 11   • metoprolol SR (TOPROL XL) 50 MG TABLET SR 24 HR Take 1 Tab by mouth every day. 90 Tab 4   • vitamin D (CHOLECALCIFEROL) 1000 UNIT Tab Take 1,000 Units by mouth 2 Times a Day.     • linagliptin (TRADJENTA) 5 MG Tab tablet TAKE ONE TABLET BY MOUTH DAILY 30 Tab 11   • aspirin (ASA) 325 MG TABS Take 1 Tab by mouth every 6 hours as needed for Mild Pain. 30 Each 6   • Blood Glucose Monitoring Suppl SUPPLIES Misc Test strips order: Test strips for Accucheck Linda meter. Sig: use tid  #100 RF x 3 100 Each 3   • VENTOLIN  (90 BASE) MCG/ACT Aero Soln inhalation aerosol INHALE TWO PUFFS BY MOUTH EVERY 6 HOURS AS NEEDED FOR SHORTNESS OF BREATH 1 Inhaler 6   • COLCRYS 0.6 MG TABS TAKE ONE TABLET BY MOUTH AS NEEDED AT FIRST SIGN OF GOUT, MAY REPEAT IN 1 HOUR IF SYMPTOMS PERSIST. 30 Tab 5     No current facility-administered medications for this visit.         Allergies as of 07/10/2017 - Vishal as Reviewed 07/10/2017   Allergen Reaction Noted   • Lisinopril  04/17/2012        ROS: As per HPI.    /70 mmHg  Pulse 69  Temp(Src) 35.8 °C (96.4 °F)  Ht 1.575 m (5' 2\")  Wt 94.348 kg (208 lb)  BMI 38.03 kg/m2  SpO2 92%    Physical Exam:  Gen:         Alert " and oriented, No apparent distress.  Neck:        No Lymphadenopathy or Bruits.  Lungs:     Clear to auscultation bilaterally  CV:          Regular rate and rhythm. No murmurs, rubs or gallops.               Ext:          No clubbing, cyanosis, edema. Mild decreased sensation right foot.      Assessment and Plan.   71 y.o. female with the following issues.    1. Diabetes mellitus with background retinopathy (CMS-[facility])  She is followed by ophthalmology for her retinopathy. Decreasing Lantus to 33 units she will call back with any further hypoglycemia. Will await results from new blood work if kidney function still off have instructed to discuss with nephrologist and his continue metformin answers plantar room in her blood sugars  - Diabetic Monofilament LE Exam  - POCT Hemoglobin A1C    2. Need for shingles vaccine    - VARICELLA ZOSTER VACCINE SQ    Over 40 minutes was spent with the patient of which over 50% of the time was spent with face-to-face patient education and care coordination.

## 2017-07-12 ENCOUNTER — HOSPITAL ENCOUNTER (OUTPATIENT)
Dept: LAB | Facility: MEDICAL CENTER | Age: 72
End: 2017-07-12
Attending: INTERNAL MEDICINE
Payer: MEDICARE

## 2017-07-12 DIAGNOSIS — N18.30 CHRONIC KIDNEY DISEASE, STAGE III (MODERATE) (HCC): ICD-10-CM

## 2017-07-12 LAB
25(OH)D3 SERPL-MCNC: 51 NG/ML (ref 30–100)
ANION GAP SERPL CALC-SCNC: 8 MMOL/L (ref 0–11.9)
BUN SERPL-MCNC: 29 MG/DL (ref 8–22)
CALCIUM SERPL-MCNC: 9.8 MG/DL (ref 8.5–10.5)
CHLORIDE SERPL-SCNC: 103 MMOL/L (ref 96–112)
CO2 SERPL-SCNC: 23 MMOL/L (ref 20–33)
CREAT SERPL-MCNC: 1.39 MG/DL (ref 0.5–1.4)
CREAT UR-MCNC: 153 MG/DL
ERYTHROCYTE [DISTWIDTH] IN BLOOD BY AUTOMATED COUNT: 44 FL (ref 35.9–50)
GFR SERPL CREATININE-BSD FRML MDRD: 38 ML/MIN/1.73 M 2
GLUCOSE SERPL-MCNC: 92 MG/DL (ref 65–99)
HCT VFR BLD AUTO: 38 % (ref 37–47)
HGB BLD-MCNC: 13 G/DL (ref 12–16)
MCH RBC QN AUTO: 31.5 PG (ref 27–33)
MCHC RBC AUTO-ENTMCNC: 34.2 G/DL (ref 33.6–35)
MCV RBC AUTO: 92 FL (ref 81.4–97.8)
MICROALBUMIN UR-MCNC: 6.6 MG/DL
MICROALBUMIN/CREAT UR: 43 MG/G (ref 0–30)
PLATELET # BLD AUTO: 200 K/UL (ref 164–446)
PMV BLD AUTO: 11.4 FL (ref 9–12.9)
POTASSIUM SERPL-SCNC: 4.1 MMOL/L (ref 3.6–5.5)
PTH-INTACT SERPL-MCNC: 33.6 PG/ML (ref 14–72)
RBC # BLD AUTO: 4.13 M/UL (ref 4.2–5.4)
SODIUM SERPL-SCNC: 134 MMOL/L (ref 135–145)
WBC # BLD AUTO: 8.1 K/UL (ref 4.8–10.8)

## 2017-07-12 PROCEDURE — 80048 BASIC METABOLIC PNL TOTAL CA: CPT

## 2017-07-12 PROCEDURE — 82306 VITAMIN D 25 HYDROXY: CPT

## 2017-07-12 PROCEDURE — 83970 ASSAY OF PARATHORMONE: CPT

## 2017-07-12 PROCEDURE — 85027 COMPLETE CBC AUTOMATED: CPT

## 2017-07-12 PROCEDURE — 82043 UR ALBUMIN QUANTITATIVE: CPT

## 2017-07-12 PROCEDURE — 36415 COLL VENOUS BLD VENIPUNCTURE: CPT

## 2017-07-12 PROCEDURE — 82570 ASSAY OF URINE CREATININE: CPT

## 2017-07-14 ENCOUNTER — OFFICE VISIT (OUTPATIENT)
Dept: NEPHROLOGY | Facility: MEDICAL CENTER | Age: 72
End: 2017-07-14
Payer: MEDICARE

## 2017-07-14 ENCOUNTER — TELEPHONE (OUTPATIENT)
Dept: MEDICAL GROUP | Facility: MEDICAL CENTER | Age: 72
End: 2017-07-14

## 2017-07-14 VITALS
DIASTOLIC BLOOD PRESSURE: 76 MMHG | HEART RATE: 63 BPM | RESPIRATION RATE: 14 BRPM | SYSTOLIC BLOOD PRESSURE: 140 MMHG | HEIGHT: 62 IN | TEMPERATURE: 98.6 F | BODY MASS INDEX: 37.73 KG/M2 | WEIGHT: 205 LBS

## 2017-07-14 DIAGNOSIS — R80.9 MICROALBUMINURIA: ICD-10-CM

## 2017-07-14 DIAGNOSIS — I10 ESSENTIAL HYPERTENSION: ICD-10-CM

## 2017-07-14 DIAGNOSIS — N18.30 CHRONIC KIDNEY DISEASE, STAGE III (MODERATE) (HCC): ICD-10-CM

## 2017-07-14 DIAGNOSIS — E11.21 DIABETIC NEPHROPATHY ASSOCIATED WITH TYPE 2 DIABETES MELLITUS (HCC): ICD-10-CM

## 2017-07-14 PROCEDURE — 99214 OFFICE O/P EST MOD 30 MIN: CPT | Performed by: INTERNAL MEDICINE

## 2017-07-14 ASSESSMENT — ENCOUNTER SYMPTOMS
CHILLS: 0
FEVER: 0
SHORTNESS OF BREATH: 0

## 2017-07-14 NOTE — MR AVS SNAPSHOT
"        Abigail M Tobi   2017 9:00 AM   Office Visit   MRN: [id number]    Department:  [facility]   Dept Phone:  [phone]    Description:  Female : 1945   Provider:  Jamel Gonzalez M.D.           Reason for Visit     Follow-Up           Allergies as of 2017     Allergen Noted Reactions    Lisinopril 2012       \"cough\"      You were diagnosed with     Chronic kidney disease, stage III (moderate)   [585.3.ICD-9-CM]       Essential hypertension   [5159093]       Microalbuminuria   [463594]       Diabetic nephropathy associated with type 2 diabetes mellitus (CMS-[facility])   [2456523]         Vital Signs     Blood Pressure Pulse Temperature Respirations Height Weight    140/76 mmHg 63 37 °C (98.6 °F) (Temporal) 14 1.575 m (5' 2\") 92.987 kg (205 lb)    Body Mass Index Smoking Status                37.49 kg/m2 Never Smoker           Basic Information     Date Of Birth Sex Race Ethnicity Preferred Language    1945 Female Unknown Non- English      Your appointments     Devon 10, 2018  1:00 PM   Established Patient with Alessandro Scott M.D.   [facility] [address]    [address]  [address]  [facility] [address] [phone]           You will be receiving a confirmation call a few days before your appointment from our automated call confirmation system.              Problem List              ICD-10-CM Priority Class Noted - Resolved    Diabetes type 2, controlled (CMS-[facility]) E11.9 High  2012 - Present    Essential hypertension I10   2012 - Present    Gout, arthropathy (Chronic) M10.9   2012 - Present    Dyslipidemia E78.5   2012 - Present    Systolic murmur R01.1   2012 - Present    Microalbuminuria R80.9   2012 - Present    MEDICAL HOME    Unknown - Present    Chronic kidney disease, stage III (moderate) N18.3 High  2013 - Present    Diabetic nephropathy (CMS-HCC) E11.21   11/15/2013 - Present    Severe obesity (BMI " 35.0-35.9 with comorbidity) (CMS-HCC) E66.01, Z68.35   6/9/2014 - Present    Asthma, mild intermittent J45.20   9/22/2014 - Present    Diabetes mellitus with background retinopathy (CMS-HCC) E11.319 High  4/22/2015 - Present    Diabetic macular edema (CMS-HCC) E11.311 High  4/22/2015 - Present    Diastolic dysfunction I51.9 High  4/22/2015 - Present    Secondary hyperparathyroidism (CMS-HCC) N25.81 High  4/22/2015 - Present    History of TIA (transient ischemic attack) (Chronic) Z86.73   5/15/2015 - Present    PAD (peripheral artery disease) (CMS-HCC) I73.9   1/5/2016 - Present      Health Maintenance        Date Due Completion Dates    IMM DTaP/Tdap/Td Vaccine (1 - Tdap) 11/14/1966 ---    IMM INFLUENZA (1) 9/1/2017 10/7/2016, 9/18/2015, 9/30/2014, 11/15/2013, 11/1/2012    IMM PNEUMOCOCCAL 65+ (ADULT) LOW/MEDIUM RISK SERIES (2 of 2 - PPSV23) 11/1/2017 5/9/2016, 11/1/2012    A1C SCREENING 1/10/2018 7/10/2017, 3/24/2017, 10/5/2016, 6/30/2016, 3/31/2016, 11/16/2015, 8/14/2015, 4/22/2015, 9/24/2014, 5/27/2014, 3/27/2014, 11/13/2013, 5/9/2013, 2/4/2013, 10/23/2012, 7/25/2012, 2/22/2011    COLON CANCER SCREENING ANNUAL FIT 1/12/2018 1/12/2017, 11/25/2015, 9/30/2014, 2/7/2013    BONE DENSITY 2/22/2018 2/22/2013    FASTING LIPID PROFILE 3/24/2018 3/24/2017, 3/31/2016, 4/22/2015, 9/24/2014, 5/27/2014, 3/27/2014, 11/13/2013, 2/4/2013, 7/25/2012, 3/28/2012 (Done)    Override on 3/28/2012: Done    RETINAL SCREENING 6/28/2018 6/28/2017 (Done), 6/6/2016, 3/14/2016, 2/1/2016, 10/22/2015, 8/19/2015, 7/17/2015, 6/9/2015    Override on 6/28/2017: Done    DIABETES MONOFILAMENT / LE EXAM 7/10/2018 7/10/2017, 4/21/2017, 4/4/2016, 1/9/2015, 11/15/2013, 5/13/2013 (Done), 4/17/2012 (Done)    Override on 5/13/2013: Done    Override on 4/17/2012: Done    URINE ACR / MICROALBUMIN 7/12/2018 7/12/2017, 3/24/2017, 1/6/2017, 6/30/2016, 3/10/2016, 8/14/2015, 4/22/2015, 4/22/2015, 11/3/2014, 9/24/2014, 5/27/2014, 3/27/2014, 11/13/2013, 9/20/2013,  2/4/2013, 1/3/2013, 11/1/2012, 7/25/2012    SERUM CREATININE 7/12/2018 7/12/2017, 3/24/2017, 1/6/2017, 6/30/2016, 3/31/2016, 3/10/2016, 8/14/2015, 4/22/2015, 4/22/2015, 11/3/2014, 9/24/2014, 9/24/2014, 5/27/2014, 3/27/2014, 11/13/2013, 9/20/2013, 7/16/2013, 1/3/2013, 12/31/2012, 10/23/2012, 8/16/2012, 8/15/2012, 7/25/2012, 3/28/2012 (Done)    Override on 3/28/2012: Done    MAMMOGRAM 4/19/2023 4/19/2017, 3/24/2017, 12/31/2015, 10/15/2013, 8/1/2012 (Done)    Override on 8/1/2012: Done            Current Immunizations     13-VALENT PCV PREVNAR 5/9/2016    Influenza TIV (IM) 9/18/2015, 11/15/2013  9:00 AM, 11/1/2012    Influenza Vaccine Adult HD 10/7/2016    Influenza Vaccine Quad Inj (Pf) 9/30/2014    Pneumococcal polysaccharide vaccine (PPSV-23) 11/1/2012    SHINGLES VACCINE 7/10/2017      Below and/or attached are the medications your provider expects you to take. Review all of your home medications and newly ordered medications with your provider and/or pharmacist. Follow medication instructions as directed by your provider and/or pharmacist. Please keep your medication list with you and share with your provider. Update the information when medications are discontinued, doses are changed, or new medications (including over-the-counter products) are added; and carry medication information at all times in the event of emergency situations     Allergies:  LISINOPRIL - (reactions not documented)               Medications  Valid as of: July 14, 2017 -  9:15 AM    Generic Name Brand Name Tablet Size Instructions for use    Albuterol Sulfate (Aero Soln) VENTOLIN  (90 BASE) MCG/ACT INHALE TWO PUFFS BY MOUTH EVERY 6 HOURS AS NEEDED FOR SHORTNESS OF BREATH        Allopurinol (Tab) ZYLOPRIM 100 MG TAKE TWO TABLETS BY MOUTH DAILY        Aspirin (Tab)  MG Take 1 Tab by mouth every 6 hours as needed for Mild Pain.        Atorvastatin Calcium (Tab) LIPITOR 20 MG Take 1 Tab by mouth every day.        Blood Glucose  Monitoring Suppl (Misc) Blood Glucose Monitoring Suppl SUPPLIES Test strips order: Test strips for Accucheck Linda meter. Sig: use tid  #100 RF x 3        Cholecalciferol (Tab) cholecalciferol 1000 UNIT Take 1,000 Units by mouth 2 Times a Day.        Clopidogrel Bisulfate (Tab) PLAVIX 75 MG Take 1 Tab by mouth every day.        Colchicine (Tab) COLCRYS 0.6 MG TAKE ONE TABLET BY MOUTH AS NEEDED AT FIRST SIGN OF GOUT, MAY REPEAT IN 1 HOUR IF SYMPTOMS PERSIST.        Doxazosin Mesylate (Tab) CARDURA 2 MG Take 1 Tab by mouth every day.        Insulin Aspart (Solution Pen-injector) NOVOLOG 100 UNIT/ML Inject 3-15 Units as instructed 3 times a day before meals.        Insulin Glargine (Solution Pen-injector) LANTUS 100 UNIT/ML Inject 33 Units as instructed every evening.        Insulin Pen Needle (Misc) Insulin Pen Needle 31G X 5 MM 1 Each by Other route 4 times a day. TEST TID FOR HIGH AND LOW SUGAR LEVEL DX: E 11.65 (USE WITH LANTUS PEN)        Linagliptin (Tab) TRADJENTA 5 MG TAKE ONE TABLET BY MOUTH DAILY        Losartan Potassium (Tab) COZAAR 100 MG Take 1 Tab by mouth every day. TAKE ONE TABLET BY MOUTH DAILY        MetFORMIN HCl (Tab) GLUCOPHAGE 500 MG Take 500 mg by mouth 2 times a day, with meals.        Metoprolol Succinate (TABLET SR 24 HR) TOPROL XL 50 MG Take 1 Tab by mouth every day.        .                 Medicines prescribed today were sent to:     [facility] #[id number] - [address] AT [address] [address]    Phone: [phone] Fax: [phone]    Open 24 Hours?: No    [facility] - [facility] [address] [facility]    [address] [facility] [address]    Phone: [phone] Fax: [phone]    Open 24 Hours?: No      Medication refill instructions:       If your prescription bottle indicates you have medication refills left, it is not necessary to call your provider’s office. Please contact your pharmacy and they will refill your  medication.    If your prescription bottle indicates you do not have any refills left, you may request refills at any time through one of the following ways: The online [employer] system (except Urgent Care), by calling your provider’s office, or by asking your pharmacy to contact your provider’s office with a refill request. Medication refills are processed only during regular business hours and may not be available until the next business day. Your provider may request additional information or to have a follow-up visit with you prior to refilling your medication.   *Please Note: Medication refills are assigned a new Rx number when refilled electronically. Your pharmacy may indicate that no refills were authorized even though a new prescription for the same medication is available at the pharmacy. Please request the medicine by name with the pharmacy before contacting your provider for a refill.        Other Notes About Your Plan     Patient is enrolled in [facility] with Dr Tyler Lynch Access Code: Activation code not generated  Current [employer] Status: Active

## 2017-07-14 NOTE — TELEPHONE ENCOUNTER
Alessandro Scott M.D.  Keyanna Feng, Med Ass't        Caller: Unspecified (Today, 10:22 AM)                     She can just wait until tonight.

## 2017-07-14 NOTE — TELEPHONE ENCOUNTER
VOICEMAIL  1. Caller Name: Abigail                      Call Back Number: [phone]     2. Message: Patient called and forgot to take her Lantus last night. She was wondering if she should take a half dose this morning or wait to take her normal dose tonight. Patient's glucose prior to eating was 88. Please advise.    3. Patient approves office to leave a detailed voicemail/MyChart message: N\A

## 2017-07-14 NOTE — PROGRESS NOTES
"Subjective:      Abigail Britton is a 71 y.o. female who presents with CKD III Follow-Up            HPI  71 year old with CKD stage III. She is felt to have chronic kidney disease from diabetic nephropathy.     1. CKD stage III - Cr down a bit from last time, no problems urinating, no blood in the urine, no UTIs. Taking medications as prescribed. No bubbling in the toilet.   2. HTN - BP at goal today, has not been checking BP at home, on cozaar, metoprolol and cardura. No side effects from the medications.  3. Diabetic nephropathy - microalbuminuria improved. On Cozaar.      Review of Systems   Constitutional: Negative for fever and chills.   Respiratory: Negative for shortness of breath.    Cardiovascular: Negative for chest pain.   All other systems reviewed and are negative.         Objective:     /76 mmHg  Pulse 63  Temp(Src) 37 °C (98.6 °F) (Temporal)  Resp 14  Ht 1.575 m (5' 2\")  Wt 92.987 kg (205 lb)  BMI 37.49 kg/m2     Physical Exam   Constitutional: She is oriented to person, place, and time. She appears well-developed and well-nourished.   Cardiovascular: Normal rate and regular rhythm.    Pulmonary/Chest: Effort normal and breath sounds normal.   Abdominal: Soft. Bowel sounds are normal.   Neurological: She is alert and oriented to person, place, and time.   Skin: Skin is warm and dry.   Psychiatric: She has a normal mood and affect. Her behavior is normal.               Assessment/Plan:     1. Chronic kidney disease, stage III (moderate)  Cr stable, doing well, continue cozaar, questions answered. D/w patient, can continue metformin as GFR is adequate.    2. Essential hypertension  BP controlled, asked to check BP at home    3. Microalbuminuria  Improved, continue cozaar    4. Diabetic nephropathy associated with type 2 diabetes mellitus (CMS-HCC)  No progression, blood sugars are better controlled        "

## 2017-08-29 ENCOUNTER — TELEPHONE (OUTPATIENT)
Dept: MEDICAL GROUP | Facility: MEDICAL CENTER | Age: 72
End: 2017-08-29

## 2017-08-29 NOTE — TELEPHONE ENCOUNTER
Patient advised about providers message, Patient said that her blood sugar on the 8/24 at noon was 67), (8/25 at noon it was 65, 6:30 pm it was 79 and at 3:15 am it was 57) on 8/26 in the morning patients blood sugar was 79? Please advise

## 2017-08-29 NOTE — TELEPHONE ENCOUNTER
1. Caller Name: Abigail Britton                                           Call Back Number: [phone] (home)         Patient approves a detailed voicemail message: N\A    Patient said that her blood sugar has been running low so she has been testing more often, patient wanted to know if you would write a new prescription for the patients test strips because patient is almost out of test strips, Patient said she has dropped the units of Lantus Solostar to 28 units at night? Please advise

## 2017-10-25 DIAGNOSIS — M10.9 GOUT, ARTHROPATHY: Chronic | ICD-10-CM

## 2017-10-25 RX ORDER — COLCHICINE 0.6 MG/1
TABLET ORAL
Qty: 30 TAB | Refills: 6 | Status: SHIPPED | OUTPATIENT
Start: 2017-10-25 | End: 2017-10-25 | Stop reason: SDUPTHER

## 2017-10-25 RX ORDER — COLCHICINE 0.6 MG/1
TABLET ORAL
Qty: 30 TAB | Refills: 6 | Status: SHIPPED | OUTPATIENT
Start: 2017-10-25 | End: 2017-12-08 | Stop reason: SDUPTHER

## 2017-10-30 ENCOUNTER — PATIENT OUTREACH (OUTPATIENT)
Dept: HEALTH INFORMATION MANAGEMENT | Facility: OTHER | Age: 72
End: 2017-10-30

## 2017-10-30 NOTE — PROGRESS NOTES
Outcome: Left Message    Please transfer to Patient Outreach Team at [phone] when patient returns call.    WebIZ Checked & Epic Updated:  yes    [facility] Verified: yes    Attempt # 1

## 2017-12-08 ENCOUNTER — OFFICE VISIT (OUTPATIENT)
Dept: MEDICAL GROUP | Facility: MEDICAL CENTER | Age: 72
End: 2017-12-08
Payer: MEDICARE

## 2017-12-08 ENCOUNTER — TELEPHONE (OUTPATIENT)
Dept: NEPHROLOGY | Facility: MEDICAL CENTER | Age: 72
End: 2017-12-08

## 2017-12-08 VITALS
TEMPERATURE: 98.3 F | HEIGHT: 62 IN | SYSTOLIC BLOOD PRESSURE: 124 MMHG | HEART RATE: 80 BPM | BODY MASS INDEX: 34.96 KG/M2 | WEIGHT: 190 LBS | RESPIRATION RATE: 16 BRPM | DIASTOLIC BLOOD PRESSURE: 78 MMHG | OXYGEN SATURATION: 95 %

## 2017-12-08 DIAGNOSIS — I10 HTN, GOAL BELOW 130/80: ICD-10-CM

## 2017-12-08 DIAGNOSIS — E78.5 DYSLIPIDEMIA: ICD-10-CM

## 2017-12-08 DIAGNOSIS — M10.9 GOUT, ARTHROPATHY: Chronic | ICD-10-CM

## 2017-12-08 PROCEDURE — 99214 OFFICE O/P EST MOD 30 MIN: CPT | Performed by: INTERNAL MEDICINE

## 2017-12-08 RX ORDER — LOSARTAN POTASSIUM 100 MG/1
100 TABLET ORAL DAILY
Qty: 90 TAB | Refills: 3 | Status: SHIPPED | OUTPATIENT
Start: 2017-12-08 | End: 2018-09-13 | Stop reason: SDUPTHER

## 2017-12-08 RX ORDER — COLCHICINE 0.6 MG/1
TABLET ORAL
Qty: 30 TAB | Refills: 6 | Status: SHIPPED | OUTPATIENT
Start: 2017-12-08

## 2017-12-08 RX ORDER — ALLOPURINOL 100 MG/1
200 TABLET ORAL DAILY
Qty: 60 TAB | Refills: 11 | Status: SHIPPED | OUTPATIENT
Start: 2017-12-08

## 2017-12-08 RX ORDER — METOPROLOL SUCCINATE 50 MG/1
50 TABLET, EXTENDED RELEASE ORAL DAILY
Qty: 90 TAB | Refills: 4 | Status: SHIPPED | OUTPATIENT
Start: 2017-12-08

## 2017-12-08 RX ORDER — DOXAZOSIN 2 MG/1
2 TABLET ORAL DAILY
Qty: 30 TAB | Refills: 11 | Status: SHIPPED | OUTPATIENT
Start: 2017-12-08

## 2017-12-08 RX ORDER — ATORVASTATIN CALCIUM 20 MG/1
20 TABLET, FILM COATED ORAL DAILY
Qty: 30 TAB | Refills: 11 | Status: SHIPPED | OUTPATIENT
Start: 2017-12-08

## 2017-12-08 NOTE — PROGRESS NOTES
CC: Follow-up multiple issues    HPI:   Abigail presents today with the following.    1. HTN, goal below 130/80  Blood pressure currently well controlled patient planning on moving the near future requesting refill sent to the pharmacy.    2. Dyslipidemia  She has been on a statin with good control. Diabetes also under good control as of last check with an A1c of 6.6.    3. Gout, arthropathy  Maintain on colchicine when necessary.    4. BMI 34.0-34.9,adult  Weight has gone down significantly since last visit she is doing an excellent job.      Patient Active Problem List    Diagnosis Date Noted   • Diabetes mellitus with background retinopathy (CMS-HCC) 04/22/2015     Priority: High   • Diabetic macular edema (CMS-HCC) 04/22/2015     Priority: High   • Diastolic dysfunction 04/22/2015     Priority: High   • Secondary hyperparathyroidism (CMS-HCC) 04/22/2015     Priority: High   • Chronic kidney disease, stage III (moderate) 07/22/2013     Priority: High   • Diabetes type 2, controlled (CMS-HCC) 02/29/2012     Priority: High   • PAD (peripheral artery disease) (CMS-HCC) 01/05/2016   • History of TIA (transient ischemic attack) 05/15/2015   • Asthma, mild intermittent 09/22/2014   • Severe obesity (BMI 35.0-35.9 with comorbidity) (CMS-HCC) 06/09/2014   • Diabetic nephropathy (CMS-HCC) 11/15/2013   • Microalbuminuria 11/01/2012   • Systolic murmur 08/16/2012   • Gout, arthropathy 04/17/2012   • Dyslipidemia 04/17/2012   • Essential hypertension 02/29/2012       Current Outpatient Prescriptions   Medication Sig Dispense Refill   • losartan (COZAAR) 100 MG Tab Take 1 Tab by mouth every day. TAKE ONE TABLET BY MOUTH DAILY 90 Tab 3   • atorvastatin (LIPITOR) 20 MG Tab Take 1 Tab by mouth every day. 30 Tab 11   • insulin glargine (LANTUS SOLOSTAR) 100 UNIT/ML Solution Pen-injector injection Inject 33 Units as instructed every evening. 5 PEN 3   • metformin (GLUCOPHAGE) 500 MG Tab Take 1 Tab by mouth 2 times a day, with  "meals. 60 Tab 11   • doxazosin (CARDURA) 2 MG Tab Take 1 Tab by mouth every day. 30 Tab 11   • allopurinol (ZYLOPRIM) 100 MG Tab Take 2 Tabs by mouth every day. 60 Tab 11   • linagliptin (TRADJENTA) 5 MG Tab tablet Take 1 Tab by mouth every day. TAKE ONE TABLET BY MOUTH DAILY 30 Tab 11   • metoprolol SR (TOPROL XL) 50 MG TABLET SR 24 HR Take 1 Tab by mouth every day. 90 Tab 4   • colchicine (COLCRYS) 0.6 MG Tab TAKE ONE TABLET BY MOUTH AS NEEDED AT FIRST SIGN OF GOUT, MAY REPEAT IN 1 HOUR IF SYMPTOMS PERSIST. 30 Tab 6   • NOVOLOG, insulin aspart, (NOVOLOG FLEXPEN) 100 UNIT/ML Solution Pen-injector injection Inject 3-15 Units as instructed 3 times a day before meals. 5 PEN 6   • Blood Glucose Monitoring Suppl Supplies Misc Test strips order: Test strips for contour meter. Sig: use tid  #100 RF x 3 100 Each 3   • Insulin Pen Needle (B-D UF III MINI PEN NEEDLES) 31G X 5 MM Misc 1 Each by Other route 4 times a day. TEST TID FOR HIGH AND LOW SUGAR LEVEL DX: E 11.65 (USE WITH LANTUS PEN) 100 Each 11   • clopidogrel (PLAVIX) 75 MG Tab Take 1 Tab by mouth every day. 30 Tab 6   • vitamin D (CHOLECALCIFEROL) 1000 UNIT Tab Take 1,000 Units by mouth 2 Times a Day.     • VENTOLIN  (90 BASE) MCG/ACT Aero Soln inhalation aerosol INHALE TWO PUFFS BY MOUTH EVERY 6 HOURS AS NEEDED FOR SHORTNESS OF BREATH 1 Inhaler 6   • aspirin (ASA) 325 MG TABS Take 1 Tab by mouth every 6 hours as needed for Mild Pain. 30 Each 6     No current facility-administered medications for this visit.          Allergies as of 12/08/2017 - Reviewed 12/08/2017   Allergen Reaction Noted   • Lisinopril  04/17/2012        ROS: As per HPI.    /78   Pulse 80   Temp 36.8 °C (98.3 °F)   Resp 16   Ht 1.575 m (5' 2\")   Wt 86.2 kg (190 lb)   SpO2 95%   BMI 34.75 kg/m²     Physical Exam:  Gen:         Alert and oriented, No apparent distress.  Neck:        No Lymphadenopathy or Bruits.  Lungs:     Clear to auscultation bilaterally  CV:          " Regular rate and rhythm. No murmurs, rubs or gallops.               Ext:          No clubbing, cyanosis, edema.      Assessment and Plan.   72 y.o. female with the following issues.    1. HTN, goal below 130/80  Currently well controlled, Discuss diet, exercise and salt restriction.  - losartan (COZAAR) 100 MG Tab; Take 1 Tab by mouth every day. TAKE ONE TABLET BY MOUTH DAILY  Dispense: 90 Tab; Refill: 3  - doxazosin (CARDURA) 2 MG Tab; Take 1 Tab by mouth every day.  Dispense: 30 Tab; Refill: 11  - metoprolol SR (TOPROL XL) 50 MG TABLET SR 24 HR; Take 1 Tab by mouth every day.  Dispense: 90 Tab; Refill: 4    2. Dyslipidemia  Lipids currently well controlled. Discussed continued diet and exercise recheck 6 months to 1 year.  - atorvastatin (LIPITOR) 20 MG Tab; Take 1 Tab by mouth every day.  Dispense: 30 Tab; Refill: 11    3. Gout, arthropathy  Refilled medication  - colchicine (COLCRYS) 0.6 MG Tab; TAKE ONE TABLET BY MOUTH AS NEEDED AT FIRST SIGN OF GOUT, MAY REPEAT IN 1 HOUR IF SYMPTOMS PERSIST.  Dispense: 30 Tab; Refill: 6    4. BMI 34.0-34.9,adult  Continue work with weight.  - Patient identified as having weight management issue.  Appropriate orders and counseling given.

## 2017-12-13 DIAGNOSIS — N18.30 CHRONIC KIDNEY DISEASE, STAGE III (MODERATE) (HCC): ICD-10-CM

## 2017-12-13 DIAGNOSIS — E11.9 CONTROLLED TYPE 2 DIABETES MELLITUS WITHOUT COMPLICATION, WITHOUT LONG-TERM CURRENT USE OF INSULIN (HCC): ICD-10-CM

## 2017-12-19 ENCOUNTER — HOSPITAL ENCOUNTER (OUTPATIENT)
Dept: LAB | Facility: MEDICAL CENTER | Age: 72
End: 2017-12-19
Attending: INTERNAL MEDICINE
Payer: MEDICARE

## 2017-12-19 DIAGNOSIS — E11.9 CONTROLLED TYPE 2 DIABETES MELLITUS WITHOUT COMPLICATION, WITHOUT LONG-TERM CURRENT USE OF INSULIN (HCC): ICD-10-CM

## 2017-12-19 DIAGNOSIS — N18.30 CHRONIC KIDNEY DISEASE, STAGE III (MODERATE) (HCC): ICD-10-CM

## 2017-12-19 LAB
25(OH)D3 SERPL-MCNC: 43 NG/ML (ref 30–100)
ANION GAP SERPL CALC-SCNC: 6 MMOL/L (ref 0–11.9)
BUN SERPL-MCNC: 16 MG/DL (ref 8–22)
CALCIUM SERPL-MCNC: 9.4 MG/DL (ref 8.5–10.5)
CHLORIDE SERPL-SCNC: 100 MMOL/L (ref 96–112)
CO2 SERPL-SCNC: 27 MMOL/L (ref 20–33)
CREAT SERPL-MCNC: 1 MG/DL (ref 0.5–1.4)
CREAT UR-MCNC: 21.6 MG/DL
ERYTHROCYTE [DISTWIDTH] IN BLOOD BY AUTOMATED COUNT: 45.9 FL (ref 35.9–50)
GFR SERPL CREATININE-BSD FRML MDRD: 55 ML/MIN/1.73 M 2
GLUCOSE SERPL-MCNC: 80 MG/DL (ref 65–99)
HCT VFR BLD AUTO: 37.3 % (ref 37–47)
HGB BLD-MCNC: 12.5 G/DL (ref 12–16)
MCH RBC QN AUTO: 31.8 PG (ref 27–33)
MCHC RBC AUTO-ENTMCNC: 33.5 G/DL (ref 33.6–35)
MCV RBC AUTO: 94.9 FL (ref 81.4–97.8)
MICROALBUMIN UR-MCNC: 4.5 MG/DL
MICROALBUMIN/CREAT UR: 208 MG/G (ref 0–30)
PLATELET # BLD AUTO: 192 K/UL (ref 164–446)
PMV BLD AUTO: 11.7 FL (ref 9–12.9)
POTASSIUM SERPL-SCNC: 4.4 MMOL/L (ref 3.6–5.5)
PTH-INTACT SERPL-MCNC: 64.7 PG/ML (ref 14–72)
RBC # BLD AUTO: 3.93 M/UL (ref 4.2–5.4)
SODIUM SERPL-SCNC: 133 MMOL/L (ref 135–145)
WBC # BLD AUTO: 8.4 K/UL (ref 4.8–10.8)

## 2017-12-19 PROCEDURE — 83970 ASSAY OF PARATHORMONE: CPT

## 2017-12-19 PROCEDURE — 82570 ASSAY OF URINE CREATININE: CPT

## 2017-12-19 PROCEDURE — 82043 UR ALBUMIN QUANTITATIVE: CPT

## 2017-12-19 PROCEDURE — 82306 VITAMIN D 25 HYDROXY: CPT

## 2017-12-19 PROCEDURE — 36415 COLL VENOUS BLD VENIPUNCTURE: CPT

## 2017-12-19 PROCEDURE — 85027 COMPLETE CBC AUTOMATED: CPT

## 2017-12-19 PROCEDURE — 80048 BASIC METABOLIC PNL TOTAL CA: CPT

## 2017-12-19 PROCEDURE — 83036 HEMOGLOBIN GLYCOSYLATED A1C: CPT

## 2017-12-20 LAB
EST. AVERAGE GLUCOSE BLD GHB EST-MCNC: 123 MG/DL
HBA1C MFR BLD: 5.9 % (ref 0–5.6)

## 2017-12-22 ENCOUNTER — OFFICE VISIT (OUTPATIENT)
Dept: NEPHROLOGY | Facility: MEDICAL CENTER | Age: 72
End: 2017-12-22
Payer: MEDICARE

## 2017-12-22 VITALS
OXYGEN SATURATION: 100 % | WEIGHT: 192 LBS | HEIGHT: 62 IN | RESPIRATION RATE: 14 BRPM | BODY MASS INDEX: 35.33 KG/M2 | DIASTOLIC BLOOD PRESSURE: 80 MMHG | TEMPERATURE: 97 F | SYSTOLIC BLOOD PRESSURE: 134 MMHG | HEART RATE: 60 BPM

## 2017-12-22 DIAGNOSIS — R80.9 MICROALBUMINURIA: ICD-10-CM

## 2017-12-22 DIAGNOSIS — I10 ESSENTIAL HYPERTENSION: ICD-10-CM

## 2017-12-22 DIAGNOSIS — N18.30 CHRONIC KIDNEY DISEASE, STAGE III (MODERATE) (HCC): ICD-10-CM

## 2017-12-22 PROCEDURE — 99213 OFFICE O/P EST LOW 20 MIN: CPT | Performed by: INTERNAL MEDICINE

## 2017-12-31 ASSESSMENT — ENCOUNTER SYMPTOMS
SHORTNESS OF BREATH: 0
CHILLS: 0
FEVER: 0

## 2017-12-31 NOTE — PROGRESS NOTES
"Subjective:      Abigail Britton is a 71 y.o. female who presents with CKD III Follow-Up            HPI    71 year old with CKD stage III. She is felt to have chronic kidney disease from diabetic nephropathy.     1. CKD stage III - No uremic sx. Feeling well overall. Cr down to 1  2. HTN - BP controlled  3. Diabetic nephropathy - Microalbumin 208     Review of Systems   Constitutional: Negative for chills and fever.   Respiratory: Negative for shortness of breath.    Cardiovascular: Negative for chest pain.   All other systems reviewed and are negative.         Objective:     /80   Pulse 60   Temp 36.1 °C (97 °F)   Resp 14   Ht 1.575 m (5' 2\")   Wt 87.1 kg (192 lb)   SpO2 100%   BMI 35.12 kg/m²      Physical Exam   Constitutional: She is oriented to person, place, and time. She appears well-developed and well-nourished.   Cardiovascular: Normal rate and regular rhythm.    Pulmonary/Chest: Effort normal and breath sounds normal.   Abdominal: Soft. Bowel sounds are normal.   Neurological: She is alert and oriented to person, place, and time.   Skin: Skin is warm and dry.   Psychiatric: She has a normal mood and affect. Her behavior is normal.               Assessment/Plan:     1. Chronic kidney disease, stage III (moderate)  Cr 1, doing well    2. Essential hypertension  BP at goal    3. Microalbuminuria  Continue ARB    4. Diabetic nephropathy associated with type 2 diabetes mellitus (CMS-HCC)  Slight increase in micro albumin, will monitor      "

## 2018-09-13 DIAGNOSIS — I10 HTN, GOAL BELOW 130/80: ICD-10-CM

## 2018-09-13 RX ORDER — LOSARTAN POTASSIUM 100 MG/1
100 TABLET ORAL DAILY
Qty: 90 TAB | Refills: 3 | Status: SHIPPED | OUTPATIENT
Start: 2018-09-13

## 2018-10-04 DIAGNOSIS — E11.3299 DIABETES MELLITUS WITH BACKGROUND RETINOPATHY (HCC): ICD-10-CM

## 2018-10-08 DIAGNOSIS — I10 HTN, GOAL BELOW 130/80: ICD-10-CM

## 2020-04-28 ENCOUNTER — APPOINTMENT (RX ONLY)
Dept: URBAN - NONMETROPOLITAN AREA CLINIC 27 | Facility: CLINIC | Age: 75
Setting detail: DERMATOLOGY
End: 2020-04-28

## 2020-04-28 DIAGNOSIS — L57.0 ACTINIC KERATOSIS: ICD-10-CM

## 2020-04-28 DIAGNOSIS — I78.8 OTHER DISEASES OF CAPILLARIES: ICD-10-CM

## 2020-04-28 DIAGNOSIS — D18.0 HEMANGIOMA: ICD-10-CM

## 2020-04-28 DIAGNOSIS — Z00.8 ENCOUNTER FOR OTHER GENERAL EXAMINATION: ICD-10-CM

## 2020-04-28 DIAGNOSIS — L82.1 OTHER SEBORRHEIC KERATOSIS: ICD-10-CM

## 2020-04-28 DIAGNOSIS — Z85.828 PERSONAL HISTORY OF OTHER MALIGNANT NEOPLASM OF SKIN: ICD-10-CM

## 2020-04-28 PROBLEM — D48.5 NEOPLASM OF UNCERTAIN BEHAVIOR OF SKIN: Status: ACTIVE | Noted: 2020-04-28

## 2020-04-28 PROBLEM — D18.01 HEMANGIOMA OF SKIN AND SUBCUTANEOUS TISSUE: Status: ACTIVE | Noted: 2020-04-28

## 2020-04-28 PROCEDURE — ? COUNSELING

## 2020-04-28 PROCEDURE — ? OTHER

## 2020-04-28 PROCEDURE — ? LIQUID NITROGEN

## 2020-04-28 PROCEDURE — ? TREATMENT REGIMEN

## 2020-04-28 PROCEDURE — 17003 DESTRUCT PREMALG LES 2-14: CPT

## 2020-04-28 PROCEDURE — 17000 DESTRUCT PREMALG LESION: CPT

## 2020-04-28 PROCEDURE — ? PRESCRIPTION

## 2020-04-28 PROCEDURE — 99203 OFFICE O/P NEW LOW 30 MIN: CPT | Mod: 25

## 2020-04-28 RX ORDER — CALCIPOTRIENE 0.05 MG/G
CREAM TOPICAL
Qty: 1 | Refills: 1 | Status: ERX | COMMUNITY
Start: 2020-04-28

## 2020-04-28 RX ADMIN — CALCIPOTRIENE: 0.05 CREAM TOPICAL at 00:00

## 2020-04-28 ASSESSMENT — LOCATION DETAILED DESCRIPTION DERM
LOCATION DETAILED: LEFT LATERAL ANKLE
LOCATION DETAILED: RIGHT ULNAR DORSAL HAND
LOCATION DETAILED: RIGHT INFERIOR MEDIAL FOREHEAD
LOCATION DETAILED: RIGHT DISTAL POSTERIOR UPPER ARM
LOCATION DETAILED: GLABELLA
LOCATION DETAILED: LEFT DISTAL LATERAL PRETIBIAL REGION
LOCATION DETAILED: INFERIOR THORACIC SPINE
LOCATION DETAILED: LEFT PROXIMAL DORSAL FOREARM
LOCATION DETAILED: LEFT LATERAL ANKLE
LOCATION DETAILED: LEFT ULNAR DORSAL HAND
LOCATION DETAILED: MID-OCCIPITAL SCALP
LOCATION DETAILED: LEFT PROXIMAL LATERAL PRETIBIAL REGION
LOCATION DETAILED: RIGHT INFERIOR FOREHEAD
LOCATION DETAILED: RIGHT RADIAL DORSAL HAND
LOCATION DETAILED: LEFT LATERAL KNEE

## 2020-04-28 ASSESSMENT — LOCATION ZONE DERM
LOCATION ZONE: SCALP
LOCATION ZONE: TRUNK
LOCATION ZONE: LEG
LOCATION ZONE: ARM
LOCATION ZONE: HAND
LOCATION ZONE: FACE
LOCATION ZONE: LEG
LOCATION ZONE: FACE

## 2020-04-28 ASSESSMENT — LOCATION SIMPLE DESCRIPTION DERM
LOCATION SIMPLE: LEFT FOREARM
LOCATION SIMPLE: RIGHT POSTERIOR UPPER ARM
LOCATION SIMPLE: POSTERIOR SCALP
LOCATION SIMPLE: LEFT KNEE
LOCATION SIMPLE: LEFT LOWER LEG
LOCATION SIMPLE: RIGHT HAND
LOCATION SIMPLE: LEFT LOWER LEG
LOCATION SIMPLE: GLABELLA
LOCATION SIMPLE: UPPER BACK
LOCATION SIMPLE: RIGHT FOREHEAD
LOCATION SIMPLE: LEFT HAND

## 2020-04-28 NOTE — PROCEDURE: TREATMENT REGIMEN
Initiate Treatment: Calcipotriene .005% mix with 5 FU at home apply thin layer to lesion on ankle and back of hands twice daily for 4 days, wait 1 month and repeat
Detail Level: Zone

## 2020-07-24 NOTE — PROCEDURE: LIQUID NITROGEN
PEDS CARDIOLOGY  [facility] [address] [place] [address]  [address] [facility] [id number] [phone]      Cardiology Clinic   RN Care Coordinators, Lesley Omalley (Bre) or Caitlyn Cortes  [phone]  Pediatric Call Center/Scheduling  [phone]    After Hours and Emergency Contact Number  [phone]  * Ask for the pediatric cardiologist on call         Prescription Renewals  The pharmacy must fax requests to [phone]  * Please allow 3-4 days for prescriptions to be authorized       
Render Note In Bullet Format When Appropriate: No
Post-Care Instructions: I reviewed with the patient in detail post-care instructions. Patient is to wear sunprotection, and avoid picking at any of the treated lesions. Pt may apply Vaseline to crusted or scabbing areas.
Duration Of Freeze Thaw-Cycle (Seconds): 0
Consent: The patient's consent was obtained including but not limited to risks of crusting, scabbing, blistering, scarring, darker or lighter pigmentary change, recurrence, incomplete removal and infection.
Detail Level: Detailed
Number Of Freeze-Thaw Cycles: 1 freeze-thaw cycle

## 2020-07-29 ENCOUNTER — APPOINTMENT (RX ONLY)
Dept: URBAN - NONMETROPOLITAN AREA CLINIC 27 | Facility: CLINIC | Age: 75
Setting detail: DERMATOLOGY
End: 2020-07-29

## 2020-07-29 VITALS — TEMPERATURE: 97.3 F

## 2020-07-29 DIAGNOSIS — L57.0 ACTINIC KERATOSIS: ICD-10-CM

## 2020-07-29 DIAGNOSIS — Z00.8 ENCOUNTER FOR OTHER GENERAL EXAMINATION: ICD-10-CM

## 2020-07-29 DIAGNOSIS — L82.1 OTHER SEBORRHEIC KERATOSIS: ICD-10-CM

## 2020-07-29 PROBLEM — D48.5 NEOPLASM OF UNCERTAIN BEHAVIOR OF SKIN: Status: ACTIVE | Noted: 2020-07-29

## 2020-07-29 PROCEDURE — ? TREATMENT REGIMEN

## 2020-07-29 PROCEDURE — 99213 OFFICE O/P EST LOW 20 MIN: CPT

## 2020-07-29 PROCEDURE — ? COUNSELING

## 2020-07-29 PROCEDURE — ? OTHER

## 2020-07-29 ASSESSMENT — LOCATION DETAILED DESCRIPTION DERM
LOCATION DETAILED: LEFT LATERAL FOREHEAD
LOCATION DETAILED: LEFT ELBOW
LOCATION DETAILED: LEFT LATERAL ANKLE

## 2020-07-29 ASSESSMENT — LOCATION SIMPLE DESCRIPTION DERM
LOCATION SIMPLE: LEFT FOREHEAD
LOCATION SIMPLE: LEFT ELBOW
LOCATION SIMPLE: LEFT LOWER LEG

## 2020-07-29 ASSESSMENT — LOCATION ZONE DERM
LOCATION ZONE: FACE
LOCATION ZONE: LEG
LOCATION ZONE: ARM

## 2020-07-29 NOTE — PROCEDURE: TREATMENT REGIMEN
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  She is currently on both Eliquis and Plavix. She will be following up with her cardiologist next week and thinks she will discontinue one of the blood thinners. Discussed risks and benefits of performing a biopsy vs treating with liquid nitrogen vs repeating topical chemotherapy compound. Due to health comorbidities, will repeat treatment with Efudex/calcipotriene compound cream twice daily x 4 days - repeat in 1 month - for a total of two additional cycles and then re-evaluate.  Site has decreased in size, but has not fully resolved.  Patient is in agreement with treatment plan and voices understanding
Detail Level: Detailed

## 2020-09-23 ENCOUNTER — APPOINTMENT (RX ONLY)
Dept: URBAN - NONMETROPOLITAN AREA CLINIC 27 | Facility: CLINIC | Age: 75
Setting detail: DERMATOLOGY
End: 2020-09-23

## 2020-09-23 VITALS — TEMPERATURE: 97.3 F

## 2020-09-23 DIAGNOSIS — L82.0 INFLAMED SEBORRHEIC KERATOSIS: ICD-10-CM

## 2020-09-23 DIAGNOSIS — L57.0 ACTINIC KERATOSIS: ICD-10-CM | Status: RESOLVING

## 2020-09-23 DIAGNOSIS — B07.8 OTHER VIRAL WARTS: ICD-10-CM

## 2020-09-23 DIAGNOSIS — Z00.8 ENCOUNTER FOR OTHER GENERAL EXAMINATION: ICD-10-CM

## 2020-09-23 DIAGNOSIS — L82.1 OTHER SEBORRHEIC KERATOSIS: ICD-10-CM

## 2020-09-23 PROCEDURE — ? BENIGN DESTRUCTION

## 2020-09-23 PROCEDURE — 99213 OFFICE O/P EST LOW 20 MIN: CPT | Mod: 25

## 2020-09-23 PROCEDURE — ? COUNSELING

## 2020-09-23 PROCEDURE — 17000 DESTRUCT PREMALG LESION: CPT | Mod: 59

## 2020-09-23 PROCEDURE — ? OTHER

## 2020-09-23 PROCEDURE — 17110 DESTRUCTION B9 LES UP TO 14: CPT

## 2020-09-23 PROCEDURE — ? LIQUID NITROGEN

## 2020-09-23 PROCEDURE — ? TREATMENT REGIMEN

## 2020-09-23 ASSESSMENT — LOCATION SIMPLE DESCRIPTION DERM
LOCATION SIMPLE: LEFT FOREARM
LOCATION SIMPLE: LEFT FOOT
LOCATION SIMPLE: LEFT HAND
LOCATION SIMPLE: LEFT CHEEK

## 2020-09-23 ASSESSMENT — LOCATION DETAILED DESCRIPTION DERM
LOCATION DETAILED: LEFT PROXIMAL DORSAL FOREARM
LOCATION DETAILED: LEFT CENTRAL MALAR CHEEK
LOCATION DETAILED: LEFT LATERAL ACHILLES SKIN
LOCATION DETAILED: LEFT DORSAL MIDDLE METACARPOPHALANGEAL JOINT

## 2020-09-23 ASSESSMENT — LOCATION ZONE DERM
LOCATION ZONE: ARM
LOCATION ZONE: FACE
LOCATION ZONE: HAND
LOCATION ZONE: FEET

## 2020-09-23 NOTE — PROCEDURE: LIQUID NITROGEN
Consent: The patient's consent was obtained including but not limited to risks of crusting, scabbing, blistering, scarring, darker or lighter pigmentary change, recurrence, incomplete removal and infection.
Detail Level: Detailed
Render Post-Care Instructions In Note?: no
Duration Of Freeze Thaw-Cycle (Seconds): 0
Number Of Freeze-Thaw Cycles: 1 freeze-thaw cycle
Post-Care Instructions: I reviewed with the patient in detail post-care instructions. Patient is to wear sunprotection, and avoid picking at any of the treated lesions. Pt may apply Vaseline to crusted or scabbing areas.
Medical Necessity Information: It is in your best interest to select a reason for this procedure from the list below. All of these items fulfill various CMS LCD requirements except the new and changing color options.
Medical Necessity Clause: This procedure was medically necessary because the lesions that were treated were:

## 2020-09-23 NOTE — PROCEDURE: BENIGN DESTRUCTION
Render Post-Care Instructions In Note?: no
Medical Necessity Information: It is in your best interest to select a reason for this procedure from the list below. All of these items fulfill various CMS LCD requirements except the new and changing color options.
Medical Necessity Clause: This procedure was medically necessary because the lesions that were treated were:
Consent: The patient's consent was obtained including but not limited to risks of crusting, scabbing, blistering, scarring, darker or lighter pigmentary change, recurrence, incomplete removal and infection.
Treatment Number (Will Not Render If 0): 0
Anesthesia Volume In Cc: 0.5
Detail Level: Detailed
Post-Care Instructions: I reviewed with the patient in detail post-care instructions. Patient is to wear sunprotection, and avoid picking at any of the treated lesions. Pt may apply Vaseline to crusted or scabbing areas.

## 2021-01-21 ENCOUNTER — APPOINTMENT (RX ONLY)
Dept: URBAN - NONMETROPOLITAN AREA CLINIC 25 | Facility: CLINIC | Age: 76
Setting detail: DERMATOLOGY
End: 2021-01-21

## 2021-01-21 VITALS — TEMPERATURE: 97.2 F

## 2021-01-21 DIAGNOSIS — L65.9 NONSCARRING HAIR LOSS, UNSPECIFIED: ICD-10-CM

## 2021-01-21 DIAGNOSIS — Z00.8 ENCOUNTER FOR OTHER GENERAL EXAMINATION: ICD-10-CM

## 2021-01-21 DIAGNOSIS — L57.0 ACTINIC KERATOSIS: ICD-10-CM

## 2021-01-21 DIAGNOSIS — L82.1 OTHER SEBORRHEIC KERATOSIS: ICD-10-CM

## 2021-01-21 DIAGNOSIS — L50.8 OTHER URTICARIA: ICD-10-CM

## 2021-01-21 PROCEDURE — ? TREATMENT REGIMEN

## 2021-01-21 PROCEDURE — ? PRESCRIPTION

## 2021-01-21 PROCEDURE — ? COUNSELING

## 2021-01-21 PROCEDURE — 17000 DESTRUCT PREMALG LESION: CPT

## 2021-01-21 PROCEDURE — ? LIQUID NITROGEN

## 2021-01-21 PROCEDURE — ? OTHER

## 2021-01-21 PROCEDURE — 17003 DESTRUCT PREMALG LES 2-14: CPT

## 2021-01-21 PROCEDURE — 99213 OFFICE O/P EST LOW 20 MIN: CPT | Mod: 25

## 2021-01-21 RX ORDER — FLUOCINONIDE 0.5 MG/ML
SOLUTION TOPICAL
Qty: 1 | Refills: 2 | Status: ERX | COMMUNITY
Start: 2021-01-21

## 2021-01-21 RX ADMIN — FLUOCINONIDE: 0.5 SOLUTION TOPICAL at 00:00

## 2021-01-21 ASSESSMENT — LOCATION DETAILED DESCRIPTION DERM
LOCATION DETAILED: LEFT INFERIOR VERMILION LIP
LOCATION DETAILED: LEFT INFERIOR CENTRAL MALAR CHEEK
LOCATION DETAILED: RIGHT SUPERIOR MEDIAL FOREHEAD
LOCATION DETAILED: LEFT MEDIAL FRONTAL SCALP
LOCATION DETAILED: LEFT LATERAL ACHILLES SKIN

## 2021-01-21 ASSESSMENT — LOCATION SIMPLE DESCRIPTION DERM
LOCATION SIMPLE: LEFT LIP
LOCATION SIMPLE: LEFT SCALP
LOCATION SIMPLE: LEFT FOOT
LOCATION SIMPLE: LEFT CHEEK
LOCATION SIMPLE: RIGHT FOREHEAD

## 2021-01-21 ASSESSMENT — LOCATION ZONE DERM
LOCATION ZONE: SCALP
LOCATION ZONE: FACE
LOCATION ZONE: FEET
LOCATION ZONE: LIP

## 2021-01-21 NOTE — PROCEDURE: TREATMENT REGIMEN
Plan: Treated in office today with liquid nitrogen
Detail Level: Zone
Plan: Discussed spironlactone with patient if alopecia worsens
Otc Regimen: Rogaine for men QD
Otc Regimen: Allegra BID

## 2021-03-04 ENCOUNTER — APPOINTMENT (RX ONLY)
Dept: URBAN - NONMETROPOLITAN AREA CLINIC 25 | Facility: CLINIC | Age: 76
Setting detail: DERMATOLOGY
End: 2021-03-04

## 2021-03-04 VITALS — TEMPERATURE: 97.4 F

## 2021-03-04 DIAGNOSIS — L82.1 OTHER SEBORRHEIC KERATOSIS: ICD-10-CM | Status: STABLE

## 2021-03-04 DIAGNOSIS — L57.0 ACTINIC KERATOSIS: ICD-10-CM

## 2021-03-04 DIAGNOSIS — Z87.2 PERSONAL HISTORY OF DISEASES OF THE SKIN AND SUBCUTANEOUS TISSUE: ICD-10-CM

## 2021-03-04 DIAGNOSIS — Z00.8 ENCOUNTER FOR OTHER GENERAL EXAMINATION: ICD-10-CM

## 2021-03-04 PROCEDURE — ? OTHER

## 2021-03-04 PROCEDURE — 99212 OFFICE O/P EST SF 10 MIN: CPT | Mod: 25

## 2021-03-04 PROCEDURE — 17000 DESTRUCT PREMALG LESION: CPT

## 2021-03-04 PROCEDURE — ? COUNSELING

## 2021-03-04 PROCEDURE — ? LIQUID NITROGEN

## 2021-03-04 PROCEDURE — 17003 DESTRUCT PREMALG LES 2-14: CPT

## 2021-03-04 ASSESSMENT — LOCATION DETAILED DESCRIPTION DERM
LOCATION DETAILED: LEFT LATERAL FOREHEAD
LOCATION DETAILED: RIGHT DISTAL RADIAL DORSAL FOREARM
LOCATION DETAILED: RIGHT RADIAL VENTRAL WRIST
LOCATION DETAILED: LEFT ACHILLES SKIN
LOCATION DETAILED: LEFT INFERIOR VERMILION LIP
LOCATION DETAILED: LEFT VENTRAL LATERAL DISTAL FOREARM

## 2021-03-04 ASSESSMENT — LOCATION ZONE DERM
LOCATION ZONE: ARM
LOCATION ZONE: FACE
LOCATION ZONE: LEG
LOCATION ZONE: LIP

## 2021-03-04 ASSESSMENT — LOCATION SIMPLE DESCRIPTION DERM
LOCATION SIMPLE: LEFT LIP
LOCATION SIMPLE: LEFT ACHILLES SKIN
LOCATION SIMPLE: RIGHT ARM
LOCATION SIMPLE: LEFT FOREARM
LOCATION SIMPLE: LEFT FOREHEAD
LOCATION SIMPLE: RIGHT FOREARM

## 2021-05-12 ENCOUNTER — APPOINTMENT (RX ONLY)
Dept: URBAN - NONMETROPOLITAN AREA CLINIC 25 | Facility: CLINIC | Age: 76
Setting detail: DERMATOLOGY
End: 2021-05-12

## 2021-05-12 VITALS — TEMPERATURE: 97.2 F

## 2021-05-12 DIAGNOSIS — L57.0 ACTINIC KERATOSIS: ICD-10-CM | Status: INADEQUATELY CONTROLLED

## 2021-05-12 DIAGNOSIS — Z87.2 PERSONAL HISTORY OF DISEASES OF THE SKIN AND SUBCUTANEOUS TISSUE: ICD-10-CM

## 2021-05-12 DIAGNOSIS — L65.8 OTHER SPECIFIED NONSCARRING HAIR LOSS: ICD-10-CM

## 2021-05-12 DIAGNOSIS — Z00.8 ENCOUNTER FOR OTHER GENERAL EXAMINATION: ICD-10-CM

## 2021-05-12 DIAGNOSIS — L50.8 OTHER URTICARIA: ICD-10-CM | Status: INADEQUATELY CONTROLLED

## 2021-05-12 DIAGNOSIS — L82.1 OTHER SEBORRHEIC KERATOSIS: ICD-10-CM | Status: STABLE

## 2021-05-12 PROCEDURE — 17000 DESTRUCT PREMALG LESION: CPT

## 2021-05-12 PROCEDURE — ? LIQUID NITROGEN

## 2021-05-12 PROCEDURE — ? OTHER

## 2021-05-12 PROCEDURE — ? OBSERVATION

## 2021-05-12 PROCEDURE — ? MEDICATION COUNSELING

## 2021-05-12 PROCEDURE — ? TREATMENT REGIMEN

## 2021-05-12 PROCEDURE — ? PRESCRIPTION

## 2021-05-12 PROCEDURE — ? COUNSELING

## 2021-05-12 PROCEDURE — 99213 OFFICE O/P EST LOW 20 MIN: CPT | Mod: 25

## 2021-05-12 PROCEDURE — 17003 DESTRUCT PREMALG LES 2-14: CPT

## 2021-05-12 RX ORDER — TRIAMCINOLONE ACETONIDE 1 MG/G
CREAM TOPICAL
Qty: 1 | Refills: 1 | Status: ERX | COMMUNITY
Start: 2021-05-12

## 2021-05-12 RX ADMIN — TRIAMCINOLONE ACETONIDE: 1 CREAM TOPICAL at 00:00

## 2021-05-12 ASSESSMENT — LOCATION DETAILED DESCRIPTION DERM
LOCATION DETAILED: LEFT ULNAR DORSAL HAND
LOCATION DETAILED: RIGHT DISTAL PRETIBIAL REGION
LOCATION DETAILED: RIGHT RADIAL DORSAL HAND
LOCATION DETAILED: LEFT DORSAL MIDDLE METACARPOPHALANGEAL JOINT
LOCATION DETAILED: RIGHT VENTRAL PROXIMAL FOREARM
LOCATION DETAILED: STERNAL NOTCH
LOCATION DETAILED: LEFT CENTRAL FRONTAL SCALP
LOCATION DETAILED: RIGHT PROXIMAL DORSAL INDEX FINGER
LOCATION DETAILED: LEFT DORSAL THUMB METACARPOPHALANGEAL JOINT
LOCATION DETAILED: RIGHT ANTERIOR PROXIMAL UPPER ARM
LOCATION DETAILED: LEFT OCCIPITAL SCALP
LOCATION DETAILED: LEFT SUPERIOR MEDIAL UPPER BACK
LOCATION DETAILED: LEFT INFERIOR VERMILION LIP
LOCATION DETAILED: LEFT PROXIMAL PRETIBIAL REGION
LOCATION DETAILED: RIGHT UPPER CUTANEOUS LIP
LOCATION DETAILED: LEFT SUPERIOR PARIETAL SCALP

## 2021-05-12 ASSESSMENT — LOCATION SIMPLE DESCRIPTION DERM
LOCATION SIMPLE: RIGHT INDEX FINGER
LOCATION SIMPLE: RIGHT PRETIBIAL REGION
LOCATION SIMPLE: RIGHT FOREARM
LOCATION SIMPLE: RIGHT HAND
LOCATION SIMPLE: LEFT PRETIBIAL REGION
LOCATION SIMPLE: POSTERIOR SCALP
LOCATION SIMPLE: LEFT UPPER BACK
LOCATION SIMPLE: LEFT HAND
LOCATION SIMPLE: RIGHT LIP
LOCATION SIMPLE: SCALP
LOCATION SIMPLE: RIGHT UPPER ARM
LOCATION SIMPLE: LEFT LIP
LOCATION SIMPLE: CHEST

## 2021-05-12 ASSESSMENT — LOCATION ZONE DERM
LOCATION ZONE: FINGER
LOCATION ZONE: SCALP
LOCATION ZONE: TRUNK
LOCATION ZONE: HAND
LOCATION ZONE: LEG
LOCATION ZONE: LIP
LOCATION ZONE: ARM

## 2021-05-12 NOTE — PROCEDURE: TREATMENT REGIMEN
Detail Level: Zone
Initiate Treatment: Triamcinolone apply to rash twice daily for 2 weeks, then as needed. Avoid face

## 2021-05-12 NOTE — PROCEDURE: OBSERVATION
Detail Level: Detailed
Size Of Lesion In Cm (Optional): 0
Morphology Per Location (Optional): Photo taken today

## 2021-05-12 NOTE — HPI: SKIN LESIONS
How Severe Is Your Skin Lesion?: mild
Have Your Skin Lesions Been Treated?: been treated
Is This A New Presentation, Or A Follow-Up?: Skin Lesion
When Was It Treated?: 01/21/2021

## 2021-05-12 NOTE — PROCEDURE: COUNSELING
Detail Level: Detailed
Detail Level: Zone
Detail Level: Simple
Patient Specific Counseling (Will Not Stick From Patient To Patient): \\nDiscussed treatment options including topical minoxidil vs oral spironolactone.  Pt to use OTC meds, consider oral options.  Reassess at follow up.

## 2021-05-12 NOTE — PROCEDURE: OTHER
Render Risk Assessment In Note?: yes
Note Text (......Xxx Chief Complaint.): This diagnosis correlates with the
Detail Level: Zone

## 2021-05-12 NOTE — PROCEDURE: MEDICATION COUNSELING
High Dose Vitamin A Pregnancy And Lactation Text: High dose vitamin A therapy is contraindicated during pregnancy and breast feeding.
Oxybutynin Pregnancy And Lactation Text: This medication is Pregnancy Category B and is considered safe during pregnancy. It is unknown if it is excreted in breast milk.
Clindamycin Pregnancy And Lactation Text: This medication can be used in pregnancy if certain situations. Clindamycin is also present in breast milk.
Fluconazole Counseling:  Patient counseled regarding adverse effects of fluconazole including but not limited to headache, diarrhea, nausea, upset stomach, liver function test abnormalities, taste disturbance, and stomach pain.  There is a rare possibility of liver failure that can occur when taking fluconazole.  The patient understands that monitoring of LFTs and kidney function test may be required, especially at baseline. The patient verbalized understanding of the proper use and possible adverse effects of fluconazole.  All of the patient's questions and concerns were addressed.
Cellcept Counseling:  I discussed with the patient the risks of mycophenolate mofetil including but not limited to infection/immunosuppression, GI upset, hypokalemia, hypercholesterolemia, bone marrow suppression, lymphoproliferative disorders, malignancy, GI ulceration/bleed/perforation, colitis, interstitial lung disease, kidney failure, progressive multifocal leukoencephalopathy, and birth defects.  The patient understands that monitoring is required including a baseline creatinine and regular CBC testing. In addition, patient must alert us immediately if symptoms of infection or other concerning signs are noted.
Topical Clindamycin Counseling: Patient counseled that this medication may cause skin irritation or allergic reactions.  In the event of skin irritation, the patient was advised to reduce the amount of the drug applied or use it less frequently.   The patient verbalized understanding of the proper use and possible adverse effects of clindamycin.  All of the patient's questions and concerns were addressed.
Ilumya Pregnancy And Lactation Text: The risk during pregnancy and breastfeeding is uncertain with this medication.
Finasteride Counseling:  I discussed with the patient the risks of use of finasteride including but not limited to decreased libido, decreased ejaculate volume, gynecomastia, and depression. Women should not handle medication.  All of the patient's questions and concerns were addressed.
Tazorac Pregnancy And Lactation Text: This medication is not safe during pregnancy. It is unknown if this medication is excreted in breast milk.
Azathioprine Pregnancy And Lactation Text: This medication is Pregnancy Category D and isn't considered safe during pregnancy. It is unknown if this medication is excreted in breast milk.
Valtrex Counseling: I discussed with the patient the risks of valacyclovir including but not limited to kidney damage, nausea, vomiting and severe allergy.  The patient understands that if the infection seems to be worsening or is not improving, they are to call.
Clindamycin Counseling: I counseled the patient regarding use of clindamycin as an antibiotic for prophylactic and/or therapeutic purposes. Clindamycin is active against numerous classes of bacteria, including skin bacteria. Side effects may include nausea, diarrhea, gastrointestinal upset, rash, hives, yeast infections, and in rare cases, colitis.
Siliq Counseling:  I discussed with the patient the risks of Siliq including but not limited to new or worsening depression, suicidal thoughts and behavior, immunosuppression, malignancy, posterior leukoencephalopathy syndrome, and serious infections.  The patient understands that monitoring is required including a PPD at baseline and must alert us or the primary physician if symptoms of infection or other concerning signs are noted. There is also a special program designed to monitor depression which is required with Siliq.
Hydroquinone Pregnancy And Lactation Text: This medication has not been assigned a Pregnancy Risk Category but animal studies failed to show danger with the topical medication. It is unknown if the medication is excreted in breast milk.
Albendazole Counseling:  I discussed with the patient the risks of albendazole including but not limited to cytopenia, kidney damage, nausea/vomiting and severe allergy.  The patient understands that this medication is being used in an off-label manner.
Propranolol Counseling:  I discussed with the patient the risks of propranolol including but not limited to low heart rate, low blood pressure, low blood sugar, restlessness and increased cold sensitivity. They should call the office if they experience any of these side effects.
Gabapentin Counseling: I discussed with the patient the risks of gabapentin including but not limited to dizziness, somnolence, fatigue and ataxia.
Imiquimod Pregnancy And Lactation Text: This medication is Pregnancy Category C. It is unknown if this medication is excreted in breast milk.
Fluconazole Pregnancy And Lactation Text: This medication is Pregnancy Category C and it isn't know if it is safe during pregnancy. It is also excreted in breast milk.
Imiquimod Counseling:  I discussed with the patient the risks of imiquimod including but not limited to erythema, scaling, itching, weeping, crusting, and pain.  Patient understands that the inflammatory response to imiquimod is variable from person to person and was educated regarded proper titration schedule.  If flu-like symptoms develop, patient knows to discontinue the medication and contact us.
Finasteride Pregnancy And Lactation Text: This medication is absolutely contraindicated during pregnancy. It is unknown if it is excreted in breast milk.
Albendazole Pregnancy And Lactation Text: This medication is Pregnancy Category C and it isn't known if it is safe during pregnancy. It is also excreted in breast milk.
Valtrex Pregnancy And Lactation Text: this medication is Pregnancy Category B and is considered safe during pregnancy. This medication is not directly found in breast milk but it's metabolite acyclovir is present.
Gabapentin Pregnancy And Lactation Text: This medication is Pregnancy Category C and isn't considered safe during pregnancy. It is excreted in breast milk.
Doxycycline Pregnancy And Lactation Text: This medication is Pregnancy Category D and not consider safe during pregnancy. It is also excreted in breast milk but is considered safe for shorter treatment courses.
Cyclophosphamide Counseling:  I discussed with the patient the risks of cyclophosphamide including but not limited to hair loss, hormonal abnormalities, decreased fertility, abdominal pain, diarrhea, nausea and vomiting, bone marrow suppression and infection. The patient understands that monitoring is required while taking this medication.
Topical Sulfur Applications Counseling: Topical Sulfur Counseling: Patient counseled that this medication may cause skin irritation or allergic reactions.  In the event of skin irritation, the patient was advised to reduce the amount of the drug applied or use it less frequently.   The patient verbalized understanding of the proper use and possible adverse effects of topical sulfur application.  All of the patient's questions and concerns were addressed.
Griseofulvin Counseling:  I discussed with the patient the risks of griseofulvin including but not limited to photosensitivity, cytopenia, liver damage, nausea/vomiting and severe allergy.  The patient understands that this medication is best absorbed when taken with a fatty meal (e.g., ice cream or french fries).
Ivermectin Counseling:  Patient instructed to take medication on an empty stomach with a full glass of water.  Patient informed of potential adverse effects including but not limited to nausea, diarrhea, dizziness, itching, and swelling of the extremities or lymph nodes.  The patient verbalized understanding of the proper use and possible adverse effects of ivermectin.  All of the patient's questions and concerns were addressed.
Simponi Counseling:  I discussed with the patient the risks of golimumab including but not limited to myelosuppression, immunosuppression, autoimmune hepatitis, demyelinating diseases, lymphoma, and serious infections.  The patient understands that monitoring is required including a PPD at baseline and must alert us or the primary physician if symptoms of infection or other concerning signs are noted.
Use Enhanced Medication Counseling?: No
Doxycycline Counseling:  Patient counseled regarding possible photosensitivity and increased risk for sunburn.  Patient instructed to avoid sunlight, if possible.  When exposed to sunlight, patients should wear protective clothing, sunglasses, and sunscreen.  The patient was instructed to call the office immediately if the following severe adverse effects occur:  hearing changes, easy bruising/bleeding, severe headache, or vision changes.  The patient verbalized understanding of the proper use and possible adverse effects of doxycycline.  All of the patient's questions and concerns were addressed.
Propranolol Pregnancy And Lactation Text: This medication is Pregnancy Category C and it isn't known if it is safe during pregnancy. It is excreted in breast milk.
Topical Sulfur Applications Pregnancy And Lactation Text: This medication is Pregnancy Category C and has an unknown safety profile during pregnancy. It is unknown if this topical medication is excreted in breast milk.
Skyrizi Counseling: I discussed with the patient the risks of risankizumab-rzaa including but not limited to immunosuppression, and serious infections.  The patient understands that monitoring is required including a PPD at baseline and must alert us or the primary physician if symptoms of infection or other concerning signs are noted.
Benzoyl Peroxide Counseling: Patient counseled that medicine may cause skin irritation and bleach clothing.  In the event of skin irritation, the patient was advised to reduce the amount of the drug applied or use it less frequently.   The patient verbalized understanding of the proper use and possible adverse effects of benzoyl peroxide.  All of the patient's questions and concerns were addressed.
Glycopyrrolate Counseling:  I discussed with the patient the risks of glycopyrrolate including but not limited to skin rash, drowsiness, dry mouth, difficulty urinating, and blurred vision.
Erythromycin Counseling:  I discussed with the patient the risks of erythromycin including but not limited to GI upset, allergic reaction, drug rash, diarrhea, increase in liver enzymes, and yeast infections.
Birth Control Pills Counseling: Birth Control Pill Counseling: I discussed with the patient the potential side effects of OCPs including but not limited to increased risk of stroke, heart attack, thrombophlebitis, deep venous thrombosis, hepatic adenomas, breast changes, GI upset, headaches, and depression.  The patient verbalized understanding of the proper use and possible adverse effects of OCPs. All of the patient's questions and concerns were addressed.
Minoxidil Counseling: Minoxidil is a topical medication which can increase blood flow where it is applied. It is uncertain how this medication increases hair growth. Side effects are uncommon and include stinging and allergic reactions.
Griseofulvin Pregnancy And Lactation Text: This medication is Pregnancy Category X and is known to cause serious birth defects. It is unknown if this medication is excreted in breast milk but breast feeding should be avoided.
Cyclophosphamide Pregnancy And Lactation Text: This medication is Pregnancy Category D and it isn't considered safe during pregnancy. This medication is excreted in breast milk.
Cyclosporine Counseling:  I discussed with the patient the risks of cyclosporine including but not limited to hypertension, gingival hyperplasia,myelosuppression, immunosuppression, liver damage, kidney damage, neurotoxicity, lymphoma, and serious infections. The patient understands that monitoring is required including baseline blood pressure, CBC, CMP, lipid panel and uric acid, and then 1-2 times monthly CMP and blood pressure.
Wartpeel Counseling:  I discussed with the patient the risks of Wartpeel including but not limited to erythema, scaling, itching, weeping, crusting, and pain.
Erythromycin Pregnancy And Lactation Text: This medication is Pregnancy Category B and is considered safe during pregnancy. It is also excreted in breast milk.
Mirvaso Counseling: Mirvaso is a topical medication which can decrease superficial blood flow where applied. Side effects are uncommon and include stinging, redness and allergic reactions.
Birth Control Pills Pregnancy And Lactation Text: This medication should be avoided if pregnant and for the first 30 days post-partum.
Benzoyl Peroxide Pregnancy And Lactation Text: This medication is Pregnancy Category C. It is unknown if benzoyl peroxide is excreted in breast milk.
Glycopyrrolate Pregnancy And Lactation Text: This medication is Pregnancy Category B and is considered safe during pregnancy. It is unknown if it is excreted breast milk.
Itraconazole Counseling:  I discussed with the patient the risks of itraconazole including but not limited to liver damage, nausea/vomiting, neuropathy, and severe allergy.  The patient understands that this medication is best absorbed when taken with acidic beverages such as non-diet cola or ginger ale.  The patient understands that monitoring is required including baseline LFTs and repeat LFTs at intervals.  The patient understands that they are to contact us or the primary physician if concerning signs are noted.
Mirvaso Pregnancy And Lactation Text: This medication has not been assigned a Pregnancy Risk Category. It is unknown if the medication is excreted in breast milk.
Detail Level: Simple
Ketoconazole Counseling:   Patient counseled regarding improving absorption with orange juice.  Adverse effects include but are not limited to breast enlargement, headache, diarrhea, nausea, upset stomach, liver function test abnormalities, taste disturbance, and stomach pain.  There is a rare possibility of liver failure that can occur when taking ketoconazole. The patient understands that monitoring of LFTs may be required, especially at baseline. The patient verbalized understanding of the proper use and possible adverse effects of ketoconazole.  All of the patient's questions and concerns were addressed.
Hydroxychloroquine Counseling:  I discussed with the patient that a baseline ophthalmologic exam is needed at the start of therapy and every year thereafter while on therapy. A CBC may also be warranted for monitoring.  The side effects of this medication were discussed with the patient, including but not limited to agranulocytosis, aplastic anemia, seizures, rashes, retinopathy, and liver toxicity. Patient instructed to call the office should any adverse effect occur.  The patient verbalized understanding of the proper use and possible adverse effects of Plaquenil.  All the patient's questions and concerns were addressed.
Opioid Pregnancy And Lactation Text: These medications can lead to premature delivery and should be avoided during pregnancy. These medications are also present in breast milk in small amounts.
Stelara Counseling:  I discussed with the patient the risks of ustekinumab including but not limited to immunosuppression, malignancy, posterior leukoencephalopathy syndrome, and serious infections.  The patient understands that monitoring is required including a PPD at baseline and must alert us or the primary physician if symptoms of infection or other concerning signs are noted.
Carac Pregnancy And Lactation Text: This medication is Pregnancy Category X and contraindicated in pregnancy and in women who may become pregnant. It is unknown if this medication is excreted in breast milk.
Carac Counseling:  I discussed with the patient the risks of Carac including but not limited to erythema, scaling, itching, weeping, crusting, and pain.
Metronidazole Counseling:  I discussed with the patient the risks of metronidazole including but not limited to seizures, nausea/vomiting, a metallic taste in the mouth, nausea/vomiting and severe allergy.
Cyclosporine Pregnancy And Lactation Text: This medication is Pregnancy Category C and it isn't know if it is safe during pregnancy. This medication is excreted in breast milk.
Spironolactone Counseling: Patient advised regarding risks of diarrhea, abdominal pain, hyperkalemia, birth defects (for female patients), liver toxicity and renal toxicity. The patient may need blood work to monitor liver and kidney function and potassium levels while on therapy. The patient verbalized understanding of the proper use and possible adverse effects of spironolactone.  All of the patient's questions and concerns were addressed.
Stelara Pregnancy And Lactation Text: This medication is Pregnancy Category B and is considered safe during pregnancy. It is unknown if this medication is excreted in breast milk.
Picato Counseling:  I discussed with the patient the risks of Picato including but not limited to erythema, scaling, itching, weeping, crusting, and pain.
SSKI Counseling:  I discussed with the patient the risks of SSKI including but not limited to thyroid abnormalities, metallic taste, GI upset, fever, headache, acne, arthralgias, paraesthesias, lymphadenopathy, easy bleeding, arrhythmias, and allergic reaction.
Ketoconazole Pregnancy And Lactation Text: This medication is Pregnancy Category C and it isn't know if it is safe during pregnancy. It is also excreted in breast milk and breast feeding isn't recommended.
Calcipotriene Counseling:  I discussed with the patient the risks of calcipotriene including but not limited to erythema, scaling, itching, and irritation.
Zyclara Counseling:  I discussed with the patient the risks of imiquimod including but not limited to erythema, scaling, itching, weeping, crusting, and pain.  Patient understands that the inflammatory response to imiquimod is variable from person to person and was educated regarded proper titration schedule.  If flu-like symptoms develop, patient knows to discontinue the medication and contact us.
Spironolactone Pregnancy And Lactation Text: This medication can cause feminization of the male fetus and should be avoided during pregnancy. The active metabolite is also found in breast milk.
Cimzia Counseling:  I discussed with the patient the risks of Cimzia including but not limited to immunosuppression, allergic reactions and infections.  The patient understands that monitoring is required including a PPD at baseline and must alert us or the primary physician if symptoms of infection or other concerning signs are noted.
Hydroxychloroquine Pregnancy And Lactation Text: This medication has been shown to cause fetal harm but it isn't assigned a Pregnancy Risk Category. There are small amounts excreted in breast milk.
Methotrexate Counseling:  Patient counseled regarding adverse effects of methotrexate including but not limited to nausea, vomiting, abnormalities in liver function tests. Patients may develop mouth sores, rash, diarrhea, and abnormalities in blood counts. The patient understands that monitoring is required including LFT's and blood counts.  There is a rare possibility of scarring of the liver and lung problems that can occur when taking methotrexate. Persistent nausea, loss of appetite, pale stools, dark urine, cough, and shortness of breath should be reported immediately. Patient advised to discontinue methotrexate treatment at least three months before attempting to become pregnant.  I discussed the need for folate supplements while taking methotrexate.  These supplements can decrease side effects during methotrexate treatment. The patient verbalized understanding of the proper use and possible adverse effects of methotrexate.  All of the patient's questions and concerns were addressed.
Metronidazole Pregnancy And Lactation Text: This medication is Pregnancy Category B and considered safe during pregnancy.  It is also excreted in breast milk.
Calcipotriene Pregnancy And Lactation Text: This medication has not been proven safe during pregnancy. It is unknown if this medication is excreted in breast milk.
Minocycline Pregnancy And Lactation Text: This medication is Pregnancy Category D and not consider safe during pregnancy. It is also excreted in breast milk.
Arava Counseling:  Patient counseled regarding adverse effects of Arava including but not limited to nausea, vomiting, abnormalities in liver function tests. Patients may develop mouth sores, rash, diarrhea, and abnormalities in blood counts. The patient understands that monitoring is required including LFTs and blood counts.  There is a rare possibility of scarring of the liver and lung problems that can occur when taking methotrexate. Persistent nausea, loss of appetite, pale stools, dark urine, cough, and shortness of breath should be reported immediately. Patient advised to discontinue Arava treatment and consult with a physician prior to attempting conception. The patient will have to undergo a treatment to eliminate Arava from the body prior to conception.
Terbinafine Counseling: Patient counseling regarding adverse effects of terbinafine including but not limited to headache, diarrhea, rash, upset stomach, liver function test abnormalities, itching, taste/smell disturbance, nausea, abdominal pain, and flatulence.  There is a rare possibility of liver failure that can occur when taking terbinafine.  The patient understands that a baseline LFT and kidney function test may be required. The patient verbalized understanding of the proper use and possible adverse effects of terbinafine.  All of the patient's questions and concerns were addressed.
Prednisone Counseling:  I discussed with the patient the risks of prolonged use of prednisone including but not limited to weight gain, insomnia, osteoporosis, mood changes, diabetes, susceptibility to infection, glaucoma and high blood pressure.  In cases where prednisone use is prolonged, patients should be monitored with blood pressure checks, serum glucose levels and an eye exam.  Additionally, the patient may need to be placed on GI prophylaxis, PCP prophylaxis, and calcium and vitamin D supplementation and/or a bisphosphonate.  The patient verbalized understanding of the proper use and the possible adverse effects of prednisone.  All of the patient's questions and concerns were addressed.
Sski Pregnancy And Lactation Text: This medication is Pregnancy Category D and isn't considered safe during pregnancy. It is excreted in breast milk.
Methotrexate Pregnancy And Lactation Text: This medication is Pregnancy Category X and is known to cause fetal harm. This medication is excreted in breast milk.
Libtayo Counseling- I discussed with the patient the risks of Libtayo including but not limited to nausea, vomiting, diarrhea, and bone or muscle pain.  The patient verbalized understanding of the proper use and possible adverse effects of Libtayo.  All of the patient's questions and concerns were addressed.
Minocycline Counseling: Patient advised regarding possible photosensitivity and discoloration of the teeth, skin, lips, tongue and gums.  Patient instructed to avoid sunlight, if possible.  When exposed to sunlight, patients should wear protective clothing, sunglasses, and sunscreen.  The patient was instructed to call the office immediately if the following severe adverse effects occur:  hearing changes, easy bruising/bleeding, severe headache, or vision changes.  The patient verbalized understanding of the proper use and possible adverse effects of minocycline.  All of the patient's questions and concerns were addressed.
Taltz Counseling: I discussed with the patient the risks of ixekizumab including but not limited to immunosuppression, serious infections, worsening of inflammatory bowel disease and drug reactions.  The patient understands that monitoring is required including a PPD at baseline and must alert us or the primary physician if symptoms of infection or other concerning signs are noted.
Cimzia Pregnancy And Lactation Text: This medication crosses the placenta but can be considered safe in certain situations. Cimzia may be excreted in breast milk.
Opioid Counseling: I discussed with the patient the potential side effects of opioids including but not limited to addiction, altered mental status, and depression. I stressed avoiding alcohol, benzodiazepines, muscle relaxants and sleep aids unless specifically okayed by a physician. The patient verbalized understanding of the proper use and possible adverse effects of opioids. All of the patient's questions and concerns were addressed. They were instructed to flush the remaining pills down the toilet if they did not need them for pain.
Protopic Pregnancy And Lactation Text: This medication is Pregnancy Category C. It is unknown if this medication is excreted in breast milk when applied topically.
Niacinamide Counseling: I recommended taking niacin or niacinamide, also know as vitamin B3, twice daily. Recent evidence suggests that taking vitamin B3 (500 mg twice daily) can reduce the risk of actinic keratoses and non-melanoma skin cancers. Side effects of vitamin B3 include flushing and headache.
Quinolones Counseling:  I discussed with the patient the risks of fluoroquinolones including but not limited to GI upset, allergic reaction, drug rash, diarrhea, dizziness, photosensitivity, yeast infections, liver function test abnormalities, tendonitis/tendon rupture.
Protopic Counseling: Patient may experience a mild burning sensation during topical application. Protopic is not approved in children less than 2 years of age. There have been case reports of hematologic and skin malignancies in patients using topical calcineurin inhibitors although causality is questionable.
Terbinafine Pregnancy And Lactation Text: This medication is Pregnancy Category B and is considered safe during pregnancy. It is also excreted in breast milk and breast feeding isn't recommended.
Libtayo Pregnancy And Lactation Text: This medication is contraindicated in pregnancy and when breast feeding.
Cosentyx Counseling:  I discussed with the patient the risks of Cosentyx including but not limited to worsening of Crohn's disease, immunosuppression, allergic reactions and infections.  The patient understands that monitoring is required including a PPD at baseline and must alert us or the primary physician if symptoms of infection or other concerning signs are noted.
Arava Pregnancy And Lactation Text: This medication is Pregnancy Category X and is absolutely contraindicated during pregnancy. It is unknown if it is excreted in breast milk.
5-Fu Counseling: 5-Fluorouracil Counseling:  I discussed with the patient the risks of 5-fluorouracil including but not limited to erythema, scaling, itching, weeping, crusting, and pain.
Niacinamide Pregnancy And Lactation Text: These medications are considered safe during pregnancy.
Tremfya Counseling: I discussed with the patient the risks of guselkumab including but not limited to immunosuppression, serious infections, worsening of inflammatory bowel disease and drug reactions.  The patient understands that monitoring is required including a PPD at baseline and must alert us or the primary physician if symptoms of infection or other concerning signs are noted.
Clofazimine Counseling:  I discussed with the patient the risks of clofazimine including but not limited to skin and eye pigmentation, liver damage, nausea/vomiting, gastrointestinal bleeding and allergy.
Dupixent Pregnancy And Lactation Text: This medication likely crosses the placenta but the risk for the fetus is uncertain. This medication is excreted in breast milk.
Nsaids Counseling: NSAID Counseling: I discussed with the patient that NSAIDs should be taken with food. Prolonged use of NSAIDs can result in the development of stomach ulcers.  Patient advised to stop taking NSAIDs if abdominal pain occurs.  The patient verbalized understanding of the proper use and possible adverse effects of NSAIDs.  All of the patient's questions and concerns were addressed.
Xeljanz Counseling: I discussed with the patient the risks of Xeljanz therapy including increased risk of infection, liver issues, headache, diarrhea, or cold symptoms. Live vaccines should be avoided. They were instructed to call if they have any problems.
Colchicine Counseling:  Patient counseled regarding adverse effects including but not limited to stomach upset (nausea, vomiting, stomach pain, or diarrhea).  Patient instructed to limit alcohol consumption while taking this medication.  Colchicine may reduce blood counts especially with prolonged use.  The patient understands that monitoring of kidney function and blood counts may be required, especially at baseline. The patient verbalized understanding of the proper use and possible adverse effects of colchicine.  All of the patient's questions and concerns were addressed.
Rifampin Counseling: I discussed with the patient the risks of rifampin including but not limited to liver damage, kidney damage, red-orange body fluids, nausea/vomiting and severe allergy.
Drysol Counseling:  I discussed with the patient the risks of drysol/aluminum chloride including but not limited to skin rash, itching, irritation, burning.
Rhofade Counseling: Rhofade is a topical medication which can decrease superficial blood flow where applied. Side effects are uncommon and include stinging, redness and allergic reactions.
Cimetidine Counseling:  I discussed with the patient the risks of Cimetidine including but not limited to gynecomastia, headache, diarrhea, nausea, drowsiness, arrhythmias, pancreatitis, skin rashes, psychosis, bone marrow suppression and kidney toxicity.
Dupixent Counseling: I discussed with the patient the risks of dupilumab including but not limited to eye infection and irritation, cold sores, injection site reactions, worsening of asthma, allergic reactions and increased risk of parasitic infection.  Live vaccines should be avoided while taking dupilumab. Dupilumab will also interact with certain medications such as warfarin and cyclosporine. The patient understands that monitoring is required and they must alert us or the primary physician if symptoms of infection or other concerning signs are noted.
Acitretin Counseling:  I discussed with the patient the risks of acitretin including but not limited to hair loss, dry lips/skin/eyes, liver damage, hyperlipidemia, depression/suicidal ideation, photosensitivity.  Serious rare side effects can include but are not limited to pancreatitis, pseudotumor cerebri, bony changes, clot formation/stroke/heart attack.  Patient understands that alcohol is contraindicated since it can result in liver toxicity and significantly prolong the elimination of the drug by many years.
Enbrel Counseling:  I discussed with the patient the risks of etanercept including but not limited to myelosuppression, immunosuppression, autoimmune hepatitis, demyelinating diseases, lymphoma, and infections.  The patient understands that monitoring is required including a PPD at baseline and must alert us or the primary physician if symptoms of infection or other concerning signs are noted.
Xelpumaz Pregnancy And Lactation Text: This medication is Pregnancy Category D and is not considered safe during pregnancy.  The risk during breast feeding is also uncertain.
Azithromycin Pregnancy And Lactation Text: This medication is considered safe during pregnancy and is also secreted in breast milk.
Rifampin Pregnancy And Lactation Text: This medication is Pregnancy Category C and it isn't know if it is safe during pregnancy. It is also excreted in breast milk and should not be used if you are breast feeding.
Solaraze Counseling:  I discussed with the patient the risks of Solaraze including but not limited to erythema, scaling, itching, weeping, crusting, and pain.
Azithromycin Counseling:  I discussed with the patient the risks of azithromycin including but not limited to GI upset, allergic reaction, drug rash, diarrhea, and yeast infections.
Nsaids Pregnancy And Lactation Text: These medications are considered safe up to 30 weeks gestation. It is excreted in breast milk.
Acitretin Pregnancy And Lactation Text: This medication is Pregnancy Category X and should not be given to women who are pregnant or may become pregnant in the future. This medication is excreted in breast milk.
Drysol Pregnancy And Lactation Text: This medication is considered safe during pregnancy and breast feeding.
Thalidomide Counseling: I discussed with the patient the risks of thalidomide including but not limited to birth defects, anxiety, weakness, chest pain, dizziness, cough and severe allergy.
Solaraze Pregnancy And Lactation Text: This medication is Pregnancy Category B and is considered safe. There is some data to suggest avoiding during the third trimester. It is unknown if this medication is excreted in breast milk.
Dapsone Counseling: I discussed with the patient the risks of dapsone including but not limited to hemolytic anemia, agranulocytosis, rashes, methemoglobinemia, kidney failure, peripheral neuropathy, headaches, GI upset, and liver toxicity.  Patients who start dapsone require monitoring including baseline LFTs and weekly CBCs for the first month, then every month thereafter.  The patient verbalized understanding of the proper use and possible adverse effects of dapsone.  All of the patient's questions and concerns were addressed.
Doxepin Pregnancy And Lactation Text: This medication is Pregnancy Category C and it isn't known if it is safe during pregnancy. It is also excreted in breast milk and breast feeding isn't recommended.
Xolair Counseling:  Patient informed of potential adverse effects including but not limited to fever, muscle aches, rash and allergic reactions.  The patient verbalized understanding of the proper use and possible adverse effects of Xolair.  All of the patient's questions and concerns were addressed.
Bactrim Counseling:  I discussed with the patient the risks of sulfa antibiotics including but not limited to GI upset, allergic reaction, drug rash, diarrhea, dizziness, photosensitivity, and yeast infections.  Rarely, more serious reactions can occur including but not limited to aplastic anemia, agranulocytosis, methemoglobinemia, blood dyscrasias, liver or kidney failure, lung infiltrates or desquamative/blistering drug rashes.
Odomzo Counseling- I discussed with the patient the risks of Odomzo including but not limited to nausea, vomiting, diarrhea, constipation, weight loss, changes in the sense of taste, decreased appetite, muscle spasms, and hair loss.  The patient verbalized understanding of the proper use and possible adverse effects of Odomzo.  All of the patient's questions and concerns were addressed.
Elidel Counseling: Patient may experience a mild burning sensation during topical application. Elidel is not approved in children less than 2 years of age. There have been case reports of hematologic and skin malignancies in patients using topical calcineurin inhibitors although causality is questionable.
Sarecycline Counseling: Patient advised regarding possible photosensitivity and discoloration of the teeth, skin, lips, tongue and gums.  Patient instructed to avoid sunlight, if possible.  When exposed to sunlight, patients should wear protective clothing, sunglasses, and sunscreen.  The patient was instructed to call the office immediately if the following severe adverse effects occur:  hearing changes, easy bruising/bleeding, severe headache, or vision changes.  The patient verbalized understanding of the proper use and possible adverse effects of sarecycline.  All of the patient's questions and concerns were addressed.
Doxepin Counseling:  Patient advised that the medication is sedating and not to drive a car after taking this medication. Patient informed of potential adverse effects including but not limited to dry mouth, urinary retention, and blurry vision.  The patient verbalized understanding of the proper use and possible adverse effects of doxepin.  All of the patient's questions and concerns were addressed.
Bexarotene Counseling:  I discussed with the patient the risks of bexarotene including but not limited to hair loss, dry lips/skin/eyes, liver abnormalities, hyperlipidemia, pancreatitis, depression/suicidal ideation, photosensitivity, drug rash/allergic reactions, hypothyroidism, anemia, leukopenia, infection, cataracts, and teratogenicity.  Patient understands that they will need regular blood tests to check lipid profile, liver function tests, white blood cell count, thyroid function tests and pregnancy test if applicable.
Dapsone Pregnancy And Lactation Text: This medication is Pregnancy Category C and is not considered safe during pregnancy or breast feeding.
Topical Retinoid counseling:  Patient advised to apply a pea-sized amount only at bedtime and wait 30 minutes after washing their face before applying.  If too drying, patient may add a non-comedogenic moisturizer. The patient verbalized understanding of the proper use and possible adverse effects of retinoids.  All of the patient's questions and concerns were addressed.
Xolair Pregnancy And Lactation Text: This medication is Pregnancy Category B and is considered safe during pregnancy. This medication is excreted in breast milk.
Bactrim Pregnancy And Lactation Text: This medication is Pregnancy Category D and is known to cause fetal risk.  It is also excreted in breast milk.
Hydroxyzine Counseling: Patient advised that the medication is sedating and not to drive a car after taking this medication.  Patient informed of potential adverse effects including but not limited to dry mouth, urinary retention, and blurry vision.  The patient verbalized understanding of the proper use and possible adverse effects of hydroxyzine.  All of the patient's questions and concerns were addressed.
Eucrisa Counseling: Patient may experience a mild burning sensation during topical application. Eucrisa is not approved in children less than 2 years of age.
Infliximab Counseling:  I discussed with the patient the risks of infliximab including but not limited to myelosuppression, immunosuppression, autoimmune hepatitis, demyelinating diseases, lymphoma, and serious infections.  The patient understands that monitoring is required including a PPD at baseline and must alert us or the primary physician if symptoms of infection or other concerning signs are noted.
Humira Counseling:  I discussed with the patient the risks of adalimumab including but not limited to myelosuppression, immunosuppression, autoimmune hepatitis, demyelinating diseases, lymphoma, and serious infections.  The patient understands that monitoring is required including a PPD at baseline and must alert us or the primary physician if symptoms of infection or other concerning signs are noted.
Bexarotene Pregnancy And Lactation Text: This medication is Pregnancy Category X and should not be given to women who are pregnant or may become pregnant. This medication should not be used if you are breast feeding.
Erivedge Counseling- I discussed with the patient the risks of Erivedge including but not limited to nausea, vomiting, diarrhea, constipation, weight loss, changes in the sense of taste, decreased appetite, muscle spasms, and hair loss.  The patient verbalized understanding of the proper use and possible adverse effects of Erivedge.  All of the patient's questions and concerns were addressed.
Hydroxyzine Pregnancy And Lactation Text: This medication is not safe during pregnancy and should not be taken. It is also excreted in breast milk and breast feeding isn't recommended.
Rituxan Counseling:  I discussed with the patient the risks of Rituxan infusions. Side effects can include infusion reactions, severe drug rashes including mucocutaneous reactions, reactivation of latent hepatitis and other infections and rarely progressive multifocal leukoencephalopathy.  All of the patient's questions and concerns were addressed.
Isotretinoin Pregnancy And Lactation Text: This medication is Pregnancy Category X and is considered extremely dangerous during pregnancy. It is unknown if it is excreted in breast milk.
Isotretinoin Counseling: Patient should get monthly blood tests, not donate blood, not drive at night if vision affected, not share medication, and not undergo elective surgery for 6 months after tx completed. Side effects reviewed, pt to contact office should one occur.
Cephalexin Counseling: I counseled the patient regarding use of cephalexin as an antibiotic for prophylactic and/or therapeutic purposes. Cephalexin (commonly prescribed under brand name Keflex) is a cephalosporin antibiotic which is active against numerous classes of bacteria, including most skin bacteria. Side effects may include nausea, diarrhea, gastrointestinal upset, rash, hives, yeast infections, and in rare cases, hepatitis, kidney disease, seizures, fever, confusion, neurologic symptoms, and others. Patients with severe allergies to penicillin medications are cautioned that there is about a 10% incidence of cross-reactivity with cephalosporins. When possible, patients with penicillin allergies should use alternatives to cephalosporins for antibiotic therapy.
Tetracycline Counseling: Patient counseled regarding possible photosensitivity and increased risk for sunburn.  Patient instructed to avoid sunlight, if possible.  When exposed to sunlight, patients should wear protective clothing, sunglasses, and sunscreen.  The patient was instructed to call the office immediately if the following severe adverse effects occur:  hearing changes, easy bruising/bleeding, severe headache, or vision changes.  The patient verbalized understanding of the proper use and possible adverse effects of tetracycline.  All of the patient's questions and concerns were addressed. Patient understands to avoid pregnancy while on therapy due to potential birth defects.
Tranexamic Acid Counseling:  Patient advised of the small risk of bleeding problems with tranexamic acid. They were also instructed to call if they developed any nausea, vomiting or diarrhea. All of the patient's questions and concerns were addressed.
Otezla Counseling: The side effects of Otezla were discussed with the patient, including but not limited to worsening or new depression, weight loss, diarrhea, nausea, upper respiratory tract infection, and headache. Patient instructed to call the office should any adverse effect occur.  The patient verbalized understanding of the proper use and possible adverse effects of Otezla.  All the patient's questions and concerns were addressed.
Rituxan Pregnancy And Lactation Text: This medication is Pregnancy Category C and it isn't know if it is safe during pregnancy. It is unknown if this medication is excreted in breast milk but similar antibodies are known to be excreted.
Oxybutynin Counseling:  I discussed with the patient the risks of oxybutynin including but not limited to skin rash, drowsiness, dry mouth, difficulty urinating, and blurred vision.
High Dose Vitamin A Counseling: Side effects reviewed, pt to contact office should one occur.
Tranexamic Acid Pregnancy And Lactation Text: It is unknown if this medication is safe during pregnancy or breast feeding.
Tazorac Counseling:  Patient advised that medication is irritating and drying.  Patient may need to apply sparingly and wash off after an hour before eventually leaving it on overnight.  The patient verbalized understanding of the proper use and possible adverse effects of tazorac.  All of the patient's questions and concerns were addressed.
Otezla Pregnancy And Lactation Text: This medication is Pregnancy Category C and it isn't known if it is safe during pregnancy. It is unknown if it is excreted in breast milk.
Hydroquinone Counseling:  Patient advised that medication may result in skin irritation, lightening (hypopigmentation), dryness, and burning.  In the event of skin irritation, the patient was advised to reduce the amount of the drug applied or use it less frequently.  Rarely, spots that are treated with hydroquinone can become darker (pseudoochronosis).  Should this occur, patient instructed to stop medication and call the office. The patient verbalized understanding of the proper use and possible adverse effects of hydroquinone.  All of the patient's questions and concerns were addressed.
Ilumya Counseling: I discussed with the patient the risks of tildrakizumab including but not limited to immunosuppression, malignancy, posterior leukoencephalopathy syndrome, and serious infections.  The patient understands that monitoring is required including a PPD at baseline and must alert us or the primary physician if symptoms of infection or other concerning signs are noted.
Azathioprine Counseling:  I discussed with the patient the risks of azathioprine including but not limited to myelosuppression, immunosuppression, hepatotoxicity, lymphoma, and infections.  The patient understands that monitoring is required including baseline LFTs, Creatinine, possible TPMP genotyping and weekly CBCs for the first month and then every 2 weeks thereafter.  The patient verbalized understanding of the proper use and possible adverse effects of azathioprine.  All of the patient's questions and concerns were addressed.
Cephalexin Pregnancy And Lactation Text: This medication is Pregnancy Category B and considered safe during pregnancy.  It is also excreted in breast milk but can be used safely for shorter doses.

## 2021-07-01 ENCOUNTER — APPOINTMENT (RX ONLY)
Dept: URBAN - NONMETROPOLITAN AREA CLINIC 25 | Facility: CLINIC | Age: 76
Setting detail: DERMATOLOGY
End: 2021-07-01

## 2021-07-01 VITALS — TEMPERATURE: 98.1 F

## 2021-07-01 DIAGNOSIS — L57.0 ACTINIC KERATOSIS: ICD-10-CM | Status: INADEQUATELY CONTROLLED

## 2021-07-01 DIAGNOSIS — Z00.8 ENCOUNTER FOR OTHER GENERAL EXAMINATION: ICD-10-CM

## 2021-07-01 DIAGNOSIS — T07XXXA ABRASION OR FRICTION BURN OF OTHER, MULTIPLE, AND UNSPECIFIED SITES, WITHOUT MENTION OF INFECTION: ICD-10-CM

## 2021-07-01 DIAGNOSIS — L82.1 OTHER SEBORRHEIC KERATOSIS: ICD-10-CM | Status: STABLE

## 2021-07-01 DIAGNOSIS — L50.8 OTHER URTICARIA: ICD-10-CM | Status: WELL CONTROLLED

## 2021-07-01 PROBLEM — S90.811A ABRASION, RIGHT FOOT, INITIAL ENCOUNTER: Status: ACTIVE | Noted: 2021-07-01

## 2021-07-01 PROBLEM — S90.812A ABRASION, LEFT FOOT, INITIAL ENCOUNTER: Status: ACTIVE | Noted: 2021-07-01

## 2021-07-01 PROCEDURE — ? OTHER

## 2021-07-01 PROCEDURE — ? COUNSELING

## 2021-07-01 PROCEDURE — ? LIQUID NITROGEN

## 2021-07-01 PROCEDURE — 99213 OFFICE O/P EST LOW 20 MIN: CPT | Mod: 25

## 2021-07-01 PROCEDURE — 17000 DESTRUCT PREMALG LESION: CPT

## 2021-07-01 PROCEDURE — ? TREATMENT REGIMEN

## 2021-07-01 PROCEDURE — 17003 DESTRUCT PREMALG LES 2-14: CPT

## 2021-07-01 ASSESSMENT — LOCATION ZONE DERM
LOCATION ZONE: LIP
LOCATION ZONE: LEG
LOCATION ZONE: FACE
LOCATION ZONE: HAND
LOCATION ZONE: FEET
LOCATION ZONE: ARM

## 2021-07-01 ASSESSMENT — LOCATION SIMPLE DESCRIPTION DERM
LOCATION SIMPLE: RIGHT FOOT
LOCATION SIMPLE: LEFT ANKLE
LOCATION SIMPLE: RIGHT ANKLE
LOCATION SIMPLE: RIGHT POSTERIOR UPPER ARM
LOCATION SIMPLE: LEFT FOREARM
LOCATION SIMPLE: LEFT FOOT
LOCATION SIMPLE: LEFT LIP
LOCATION SIMPLE: LEFT HAND
LOCATION SIMPLE: RIGHT HAND
LOCATION SIMPLE: LEFT CHEEK

## 2021-07-01 ASSESSMENT — LOCATION DETAILED DESCRIPTION DERM
LOCATION DETAILED: LEFT DISTAL DORSAL FOREARM
LOCATION DETAILED: RIGHT ULNAR DORSAL HAND
LOCATION DETAILED: LEFT RADIAL DORSAL HAND
LOCATION DETAILED: LEFT CENTRAL MALAR CHEEK
LOCATION DETAILED: LEFT DORSAL FOOT
LOCATION DETAILED: RIGHT LATERAL ACHILLES SKIN
LOCATION DETAILED: LEFT ANKLE
LOCATION DETAILED: RIGHT DORSAL FOOT
LOCATION DETAILED: RIGHT ANKLE
LOCATION DETAILED: LEFT INFERIOR VERMILION LIP
LOCATION DETAILED: RIGHT PROXIMAL POSTERIOR UPPER ARM

## 2021-07-01 NOTE — PROCEDURE: TREATMENT REGIMEN
Detail Level: Zone
Continue Regimen: Triamcinolone 0.1% cream twice daily to affected areas for up to 2 weeks at a time and then as needed for flares
Plan: Pt instructed to return to clinic if more sores appear. Pt verbalized understanding
Otc Regimen: CeraVe SA cream

## 2021-07-01 NOTE — PROCEDURE: LIQUID NITROGEN
Duration Of Freeze Thaw-Cycle (Seconds): 0
Post-Care Instructions: I reviewed with the patient in detail post-care instructions. Patient is to wear sunprotection, and avoid picking at any of the treated lesions. Pt may apply Vaseline to crusted or scabbing areas.
Render Note In Bullet Format When Appropriate: No
Detail Level: Detailed
Consent: The patient's consent was obtained including but not limited to risks of crusting, scabbing, blistering, scarring, darker or lighter pigmentary change, recurrence, incomplete removal and infection.
Show Aperture Variable?: Yes
Number Of Freeze-Thaw Cycles: 1 freeze-thaw cycle

## 2022-01-05 ENCOUNTER — APPOINTMENT (RX ONLY)
Dept: URBAN - NONMETROPOLITAN AREA CLINIC 25 | Facility: CLINIC | Age: 77
Setting detail: DERMATOLOGY
End: 2022-01-05

## 2022-01-05 VITALS — TEMPERATURE: 97.3 F

## 2022-01-05 DIAGNOSIS — Z00.8 ENCOUNTER FOR OTHER GENERAL EXAMINATION: ICD-10-CM

## 2022-01-05 DIAGNOSIS — L50.8 OTHER URTICARIA: ICD-10-CM | Status: INADEQUATELY CONTROLLED

## 2022-01-05 DIAGNOSIS — L82.1 OTHER SEBORRHEIC KERATOSIS: ICD-10-CM

## 2022-01-05 DIAGNOSIS — L57.0 ACTINIC KERATOSIS: ICD-10-CM

## 2022-01-05 PROCEDURE — ? COUNSELING

## 2022-01-05 PROCEDURE — ? TREATMENT REGIMEN

## 2022-01-05 PROCEDURE — ? ADDITIONAL NOTES

## 2022-01-05 PROCEDURE — 99213 OFFICE O/P EST LOW 20 MIN: CPT | Mod: 25

## 2022-01-05 PROCEDURE — 17003 DESTRUCT PREMALG LES 2-14: CPT

## 2022-01-05 PROCEDURE — ? PRESCRIPTION

## 2022-01-05 PROCEDURE — ? OTHER

## 2022-01-05 PROCEDURE — 17000 DESTRUCT PREMALG LESION: CPT

## 2022-01-05 PROCEDURE — ? LIQUID NITROGEN

## 2022-01-05 RX ORDER — BETAMETHASONE DIPROPIONATE 0.5 MG/G
CREAM TOPICAL
Qty: 45 | Refills: 3 | Status: ERX | COMMUNITY
Start: 2022-01-05

## 2022-01-05 RX ADMIN — BETAMETHASONE DIPROPIONATE 1: 0.5 CREAM TOPICAL at 00:00

## 2022-01-05 ASSESSMENT — LOCATION DETAILED DESCRIPTION DERM
LOCATION DETAILED: RIGHT RADIAL DORSAL HAND
LOCATION DETAILED: LEFT INFERIOR VERMILION LIP
LOCATION DETAILED: RIGHT LATERAL DISTAL UPPER ARM
LOCATION DETAILED: LEFT RADIAL DORSAL HAND
LOCATION DETAILED: LEFT ULNAR DORSAL HAND
LOCATION DETAILED: MID TRAPEZIAL NECK

## 2022-01-05 ASSESSMENT — LOCATION SIMPLE DESCRIPTION DERM
LOCATION SIMPLE: RIGHT HAND
LOCATION SIMPLE: LEFT HAND
LOCATION SIMPLE: TRAPEZIAL NECK
LOCATION SIMPLE: RIGHT UPPER ARM
LOCATION SIMPLE: LEFT LIP

## 2022-01-05 ASSESSMENT — LOCATION ZONE DERM
LOCATION ZONE: NECK
LOCATION ZONE: ARM
LOCATION ZONE: LIP
LOCATION ZONE: HAND

## 2022-01-05 NOTE — PROCEDURE: ADDITIONAL NOTES
Detail Level: Simple
Additional Notes: Lesion on the left inferior vermillion lip has improved since last LN2 treatment. Patient to return in 8 weeks and will biopsy if lesion has not fully resolved
Render Risk Assessment In Note?: no

## 2022-01-05 NOTE — PROCEDURE: MIPS QUALITY
Quality 130: Documentation Of Current Medications In The Medical Record: Current Medications Documented
Quality 111:Pneumonia Vaccination Status For Older Adults: Pneumococcal Vaccination Previously Received
Detail Level: Detailed
Quality 226: Preventive Care And Screening: Tobacco Use: Screening And Cessation Intervention: Patient screened for tobacco use and is an ex/non-smoker
Quality 110: Preventive Care And Screening: Influenza Immunization: Influenza Immunization previously received during influenza season
Quality 431: Preventive Care And Screening: Unhealthy Alcohol Use - Screening: Patient not identified as an unhealthy alcohol user when screened for unhealthy alcohol use using a systematic screening method

## 2022-01-05 NOTE — PROCEDURE: TREATMENT REGIMEN
Plan: Treated with liquid nitrogen in office today
Detail Level: Detailed
Discontinue Regimen: Triamcinolone 0.1% cream
Detail Level: Zone
Plan: If new treatment regimen does not help by next office visit, we will add on a Zyrtec and Pepcid daily
Initiate Treatment: Betamethasone 0.05% cream- Apply to itchy areas twice daily for up to 2 weeks at a time as needed for flares (keep refrigerated)

## 2022-01-05 NOTE — PROCEDURE: LIQUID NITROGEN
Show Applicator Variable?: Yes
Consent: The patient's consent was obtained including but not limited to risks of crusting, scabbing, blistering, scarring, darker or lighter pigmentary change, recurrence, incomplete removal and infection.
Detail Level: Detailed
Number Of Freeze-Thaw Cycles: 1 freeze-thaw cycle
Render Note In Bullet Format When Appropriate: No
Post-Care Instructions: I reviewed with the patient in detail post-care instructions. Patient is to wear sunprotection, and avoid picking at any of the treated lesions. Pt may apply Vaseline to crusted or scabbing areas.
Duration Of Freeze Thaw-Cycle (Seconds): 0

## 2022-03-15 ENCOUNTER — APPOINTMENT (RX ONLY)
Dept: URBAN - NONMETROPOLITAN AREA CLINIC 25 | Facility: CLINIC | Age: 77
Setting detail: DERMATOLOGY
End: 2022-03-15

## 2022-03-15 DIAGNOSIS — L50.8 OTHER URTICARIA: ICD-10-CM | Status: INADEQUATELY CONTROLLED

## 2022-03-15 DIAGNOSIS — Z00.8 ENCOUNTER FOR OTHER GENERAL EXAMINATION: ICD-10-CM

## 2022-03-15 DIAGNOSIS — L57.0 ACTINIC KERATOSIS: ICD-10-CM | Status: RESOLVED

## 2022-03-15 DIAGNOSIS — L91.0 HYPERTROPHIC SCAR: ICD-10-CM

## 2022-03-15 PROCEDURE — 17000 DESTRUCT PREMALG LESION: CPT

## 2022-03-15 PROCEDURE — 99213 OFFICE O/P EST LOW 20 MIN: CPT | Mod: 25

## 2022-03-15 PROCEDURE — ? TREATMENT REGIMEN

## 2022-03-15 PROCEDURE — ? PRESCRIPTION

## 2022-03-15 PROCEDURE — ? COUNSELING

## 2022-03-15 PROCEDURE — ? LIQUID NITROGEN

## 2022-03-15 PROCEDURE — 17003 DESTRUCT PREMALG LES 2-14: CPT

## 2022-03-15 PROCEDURE — ? OTHER

## 2022-03-15 RX ORDER — FAMOTIDINE 10 MG/1
TABLET ORAL
Qty: 60 | Refills: 2 | Status: ERX | COMMUNITY
Start: 2022-03-15

## 2022-03-15 RX ORDER — FEXOFENADINE 60 MG/1
TABLET, FILM COATED ORAL
Qty: 60 | Refills: 2 | Status: ERX

## 2022-03-15 RX ADMIN — FAMOTIDINE 1: 10 TABLET ORAL at 00:00

## 2022-03-15 ASSESSMENT — LOCATION SIMPLE DESCRIPTION DERM
LOCATION SIMPLE: UPPER BACK
LOCATION SIMPLE: LEFT LIP
LOCATION SIMPLE: RIGHT HAND
LOCATION SIMPLE: RIGHT WRIST
LOCATION SIMPLE: LEFT ANTERIOR NECK
LOCATION SIMPLE: CHEST

## 2022-03-15 ASSESSMENT — LOCATION DETAILED DESCRIPTION DERM
LOCATION DETAILED: RIGHT ULNAR DORSAL HAND
LOCATION DETAILED: SUPERIOR THORACIC SPINE
LOCATION DETAILED: LEFT INFERIOR VERMILION LIP
LOCATION DETAILED: RIGHT RADIAL DORSAL HAND
LOCATION DETAILED: UPPER STERNUM
LOCATION DETAILED: RIGHT DORSAL WRIST
LOCATION DETAILED: LEFT INFERIOR ANTERIOR NECK
LOCATION DETAILED: RIGHT DORSAL INDEX METACARPOPHALANGEAL JOINT

## 2022-03-15 ASSESSMENT — LOCATION ZONE DERM
LOCATION ZONE: HAND
LOCATION ZONE: NECK
LOCATION ZONE: LIP
LOCATION ZONE: TRUNK
LOCATION ZONE: ARM

## 2022-03-15 NOTE — PROCEDURE: LIQUID NITROGEN
Number Of Freeze-Thaw Cycles: 1 freeze-thaw cycle
Render Post-Care Instructions In Note?: no
Duration Of Freeze Thaw-Cycle (Seconds): 0
Post-Care Instructions: I reviewed with the patient in detail post-care instructions. Patient is to wear sunprotection, and avoid picking at any of the treated lesions. Pt may apply Vaseline to crusted or scabbing areas.
Consent: The patient's consent was obtained including but not limited to risks of crusting, scabbing, blistering, scarring, darker or lighter pigmentary change, recurrence, incomplete removal and infection.
Show Applicator Variable?: Yes
Detail Level: Detailed

## 2022-03-15 NOTE — PROCEDURE: TREATMENT REGIMEN
Detail Level: Zone
Plan: Discussed referring to allergist. Patient declines referral at this time
Initiate Treatment: Famotidine 10mg once daily \\nFexofenadine 60mg once daily \\nPatient is unable to take optimized dosing due to chronic kidney disease. Advised patient that it may take at least 4 weeks to notice improvement. Patient was encouraged to let her kidney specialist know about new medications being added. Patient verbalized understanding

## 2022-09-15 ENCOUNTER — APPOINTMENT (RX ONLY)
Dept: URBAN - NONMETROPOLITAN AREA CLINIC 25 | Facility: CLINIC | Age: 77
Setting detail: DERMATOLOGY
End: 2022-09-15

## 2022-09-15 DIAGNOSIS — L57.0 ACTINIC KERATOSIS: ICD-10-CM

## 2022-09-15 DIAGNOSIS — L82.1 OTHER SEBORRHEIC KERATOSIS: ICD-10-CM | Status: STABLE

## 2022-09-15 DIAGNOSIS — Z00.8 ENCOUNTER FOR OTHER GENERAL EXAMINATION: ICD-10-CM

## 2022-09-15 DIAGNOSIS — L81.4 OTHER MELANIN HYPERPIGMENTATION: ICD-10-CM | Status: STABLE

## 2022-09-15 PROCEDURE — 17004 DESTROY PREMAL LESIONS 15/>: CPT

## 2022-09-15 PROCEDURE — ? LIQUID NITROGEN

## 2022-09-15 PROCEDURE — 99213 OFFICE O/P EST LOW 20 MIN: CPT | Mod: 25

## 2022-09-15 PROCEDURE — ? OTHER

## 2022-09-15 PROCEDURE — ? COUNSELING

## 2022-09-15 ASSESSMENT — LOCATION ZONE DERM
LOCATION ZONE: FACE
LOCATION ZONE: LEG
LOCATION ZONE: HAND
LOCATION ZONE: LIP
LOCATION ZONE: FINGER
LOCATION ZONE: ARM

## 2022-09-15 ASSESSMENT — LOCATION DETAILED DESCRIPTION DERM
LOCATION DETAILED: RIGHT INFERIOR CENTRAL MALAR CHEEK
LOCATION DETAILED: LEFT PROXIMAL PRETIBIAL REGION
LOCATION DETAILED: RIGHT DORSAL MIDDLE METACARPOPHALANGEAL JOINT
LOCATION DETAILED: RIGHT MEDIAL DISTAL PRETIBIAL REGION
LOCATION DETAILED: LEFT INFERIOR VERMILION LIP
LOCATION DETAILED: RIGHT ULNAR DORSAL HAND
LOCATION DETAILED: RIGHT PROXIMAL DORSAL MIDDLE FINGER
LOCATION DETAILED: RIGHT RADIAL DORSAL HAND
LOCATION DETAILED: RIGHT PROXIMAL PRETIBIAL REGION
LOCATION DETAILED: RIGHT PROXIMAL DORSAL FOREARM
LOCATION DETAILED: LEFT LATERAL ANKLE
LOCATION DETAILED: LEFT INFERIOR CENTRAL MALAR CHEEK
LOCATION DETAILED: RIGHT INFERIOR LATERAL FOREHEAD
LOCATION DETAILED: LEFT DISTAL DORSAL FOREARM
LOCATION DETAILED: RIGHT VENTRAL PROXIMAL FOREARM
LOCATION DETAILED: LEFT ULNAR DORSAL HAND
LOCATION DETAILED: LEFT PROXIMAL DORSAL FOREARM
LOCATION DETAILED: LEFT DISTAL PRETIBIAL REGION
LOCATION DETAILED: RIGHT FOREHEAD

## 2022-09-15 ASSESSMENT — LOCATION SIMPLE DESCRIPTION DERM
LOCATION SIMPLE: LEFT HAND
LOCATION SIMPLE: RIGHT FOREARM
LOCATION SIMPLE: RIGHT MIDDLE FINGER
LOCATION SIMPLE: RIGHT HAND
LOCATION SIMPLE: LEFT LIP
LOCATION SIMPLE: LEFT CHEEK
LOCATION SIMPLE: LEFT PRETIBIAL REGION
LOCATION SIMPLE: RIGHT CHEEK
LOCATION SIMPLE: LEFT LOWER LEG
LOCATION SIMPLE: RIGHT PRETIBIAL REGION
LOCATION SIMPLE: LEFT FOREARM
LOCATION SIMPLE: RIGHT FOREHEAD

## 2022-09-15 NOTE — PROCEDURE: LIQUID NITROGEN
Show Applicator Variable?: Yes
Consent: The patient's consent was obtained including but not limited to risks of crusting, scabbing, blistering, scarring, darker or lighter pigmentary change, recurrence, incomplete removal and infection.
Detail Level: Detailed
Render Post-Care Instructions In Note?: no
Number Of Freeze-Thaw Cycles: 2 freeze-thaw cycles
Duration Of Freeze Thaw-Cycle (Seconds): 0
Post-Care Instructions: I reviewed with the patient in detail post-care instructions. Patient is to wear sunprotection, and avoid picking at any of the treated lesions. Pt may apply Vaseline to crusted or scabbing areas.
Application Tool (Optional): Cotton Tipped Applicator

## 2022-09-15 NOTE — PROCEDURE: MIPS QUALITY
Quality 110: Preventive Care And Screening: Influenza Immunization: Influenza Immunization Administered during Influenza season
Quality 111:Pneumonia Vaccination Status For Older Adults: Pneumococcal vaccine (PPSV23) administered on or after patient’s 60th birthday and before the end of the measurement period
Quality 130: Documentation Of Current Medications In The Medical Record: Current Medications Documented
Detail Level: Detailed

## 2023-03-16 ENCOUNTER — APPOINTMENT (RX ONLY)
Dept: URBAN - NONMETROPOLITAN AREA CLINIC 25 | Facility: CLINIC | Age: 78
Setting detail: DERMATOLOGY
End: 2023-03-16

## 2023-03-16 DIAGNOSIS — L57.0 ACTINIC KERATOSIS: ICD-10-CM

## 2023-03-16 DIAGNOSIS — L65.8 OTHER SPECIFIED NONSCARRING HAIR LOSS: ICD-10-CM

## 2023-03-16 DIAGNOSIS — L81.4 OTHER MELANIN HYPERPIGMENTATION: ICD-10-CM

## 2023-03-16 PROCEDURE — 17000 DESTRUCT PREMALG LESION: CPT

## 2023-03-16 PROCEDURE — ? COUNSELING

## 2023-03-16 PROCEDURE — ? LIQUID NITROGEN

## 2023-03-16 PROCEDURE — 17003 DESTRUCT PREMALG LES 2-14: CPT

## 2023-03-16 PROCEDURE — 99213 OFFICE O/P EST LOW 20 MIN: CPT | Mod: 25

## 2023-03-16 ASSESSMENT — LOCATION ZONE DERM
LOCATION ZONE: FACE
LOCATION ZONE: FINGER
LOCATION ZONE: SCALP
LOCATION ZONE: LIP
LOCATION ZONE: HAND

## 2023-03-16 ASSESSMENT — LOCATION SIMPLE DESCRIPTION DERM
LOCATION SIMPLE: LEFT MIDDLE FINGER
LOCATION SIMPLE: RIGHT CHEEK
LOCATION SIMPLE: ANTERIOR SCALP
LOCATION SIMPLE: SUPERIOR FOREHEAD
LOCATION SIMPLE: LEFT LIP
LOCATION SIMPLE: LEFT HAND
LOCATION SIMPLE: RIGHT MIDDLE FINGER
LOCATION SIMPLE: POSTERIOR SCALP

## 2023-03-16 ASSESSMENT — LOCATION DETAILED DESCRIPTION DERM
LOCATION DETAILED: LEFT DORSAL MIDDLE FINGER METACARPOPHALANGEAL JOINT
LOCATION DETAILED: SUPERIOR MID FOREHEAD
LOCATION DETAILED: RIGHT SUPERIOR MEDIAL BUCCAL CHEEK
LOCATION DETAILED: POSTERIOR MID-PARIETAL SCALP
LOCATION DETAILED: LEFT INFERIOR VERMILION LIP
LOCATION DETAILED: RIGHT PROXIMAL DORSAL MIDDLE FINGER
LOCATION DETAILED: MID-FRONTAL SCALP
LOCATION DETAILED: LEFT PROXIMAL DORSAL MIDDLE FINGER

## 2023-03-16 ASSESSMENT — WOMENS ALOPECIA SEVERITY SCALE: PLEASE ASSSESS THE PATIENT'S MID SCALP ALOPECIA SEVERITY BY COMPARING IT TO THESE PHOTOS: MODERATE HAIR LOSS

## 2023-03-16 NOTE — PROCEDURE: LIQUID NITROGEN
Render Post-Care Instructions In Note?: no
Show Applicator Variable?: Yes
Application Tool (Optional): Cry-AC
Number Of Freeze-Thaw Cycles: 1 freeze-thaw cycle
Post-Care Instructions: I reviewed with the patient in detail post-care instructions. Patient is to wear sunprotection, and avoid picking at any of the treated lesions. Pt may apply Vaseline to crusted or scabbing areas.
Duration Of Freeze Thaw-Cycle (Seconds): 0
Consent: The patient's consent was obtained including but not limited to risks of crusting, scabbing, blistering, scarring, darker or lighter pigmentary change, recurrence, incomplete removal and infection.
Detail Level: Detailed

## 2023-03-16 NOTE — PROCEDURE: COUNSELING
Detail Level: Zone
Patient Specific Counseling (Will Not Stick From Patient To Patient): Discussed prescription compound Minoxidil, patient to consider and contact office if she would like to proceed with Rx.  She declined prescription in office today.
Detail Level: Detailed

## 2023-09-20 ENCOUNTER — APPOINTMENT (RX ONLY)
Dept: URBAN - NONMETROPOLITAN AREA CLINIC 25 | Facility: CLINIC | Age: 78
Setting detail: DERMATOLOGY
End: 2023-09-20

## 2023-09-20 DIAGNOSIS — D18.0 HEMANGIOMA: ICD-10-CM

## 2023-09-20 DIAGNOSIS — L57.0 ACTINIC KERATOSIS: ICD-10-CM

## 2023-09-20 DIAGNOSIS — L81.4 OTHER MELANIN HYPERPIGMENTATION: ICD-10-CM

## 2023-09-20 DIAGNOSIS — L90.5 SCAR CONDITIONS AND FIBROSIS OF SKIN: ICD-10-CM

## 2023-09-20 DIAGNOSIS — L56.8 OTHER SPECIFIED ACUTE SKIN CHANGES DUE TO ULTRAVIOLET RADIATION: ICD-10-CM

## 2023-09-20 DIAGNOSIS — R23.3 SPONTANEOUS ECCHYMOSES: ICD-10-CM

## 2023-09-20 DIAGNOSIS — L82.1 OTHER SEBORRHEIC KERATOSIS: ICD-10-CM

## 2023-09-20 PROBLEM — D18.01 HEMANGIOMA OF SKIN AND SUBCUTANEOUS TISSUE: Status: ACTIVE | Noted: 2023-09-20

## 2023-09-20 PROCEDURE — ? COUNSELING

## 2023-09-20 PROCEDURE — 99213 OFFICE O/P EST LOW 20 MIN: CPT

## 2023-09-20 PROCEDURE — ? PRESCRIPTION

## 2023-09-20 PROCEDURE — ? TREATMENT REGIMEN

## 2023-09-20 RX ORDER — FLUOROURACIL 5 MG/G
CREAM TOPICAL
Qty: 40 | Refills: 0 | Status: ERX | COMMUNITY
Start: 2023-09-20

## 2023-09-20 RX ADMIN — FLUOROURACIL: 5 CREAM TOPICAL at 00:00

## 2023-09-20 ASSESSMENT — LOCATION DETAILED DESCRIPTION DERM
LOCATION DETAILED: LEFT INFERIOR VERMILION LIP
LOCATION DETAILED: LEFT RADIAL DORSAL HAND
LOCATION DETAILED: RIGHT DISTAL DORSAL FOREARM
LOCATION DETAILED: RIGHT UPPER CUTANEOUS LIP
LOCATION DETAILED: LEFT LATERAL FOREHEAD
LOCATION DETAILED: RIGHT PROXIMAL DORSAL MIDDLE FINGER
LOCATION DETAILED: RIGHT INFERIOR MEDIAL FOREHEAD
LOCATION DETAILED: LEFT INFERIOR CENTRAL MALAR CHEEK
LOCATION DETAILED: RIGHT INFERIOR CENTRAL MALAR CHEEK
LOCATION DETAILED: RIGHT DISTAL ULNAR DORSAL FOREARM

## 2023-09-20 ASSESSMENT — LOCATION SIMPLE DESCRIPTION DERM
LOCATION SIMPLE: RIGHT FOREHEAD
LOCATION SIMPLE: LEFT FOREHEAD
LOCATION SIMPLE: RIGHT CHEEK
LOCATION SIMPLE: LEFT CHEEK
LOCATION SIMPLE: LEFT LIP
LOCATION SIMPLE: LEFT HAND
LOCATION SIMPLE: RIGHT FOREARM
LOCATION SIMPLE: RIGHT MIDDLE FINGER
LOCATION SIMPLE: RIGHT LIP

## 2023-09-20 ASSESSMENT — LOCATION ZONE DERM
LOCATION ZONE: FACE
LOCATION ZONE: HAND
LOCATION ZONE: LIP
LOCATION ZONE: FINGER
LOCATION ZONE: ARM

## 2023-09-20 NOTE — PROCEDURE: MIPS QUALITY
Detail Level: Detailed
Quality 110: Preventive Care And Screening: Influenza Immunization: Influenza Immunization Administered during Influenza season
none

## 2023-09-20 NOTE — PROCEDURE: TREATMENT REGIMEN
Detail Level: Zone
Initiate Treatment: Fluorouracil 5% / Calcipotriene 0.005% cream- Apply a thin layer to affected areas on the lower lip once daily for 4 days then stop. Repeat in 1 month.
Otc Regimen: Vaseline or Aquaphor
Initiate Treatment: fluorouracil 5%/ Calcipotriene 0.005% cream- Apply a thin layer to affected areas on the left hand once daily for 4 days then stop.

## 2024-01-24 ENCOUNTER — APPOINTMENT (RX ONLY)
Dept: URBAN - NONMETROPOLITAN AREA CLINIC 25 | Facility: CLINIC | Age: 79
Setting detail: DERMATOLOGY
End: 2024-01-24

## 2024-01-24 DIAGNOSIS — L81.4 OTHER MELANIN HYPERPIGMENTATION: ICD-10-CM

## 2024-01-24 DIAGNOSIS — L82.1 OTHER SEBORRHEIC KERATOSIS: ICD-10-CM

## 2024-01-24 DIAGNOSIS — L57.0 ACTINIC KERATOSIS: ICD-10-CM

## 2024-01-24 DIAGNOSIS — L56.8 OTHER SPECIFIED ACUTE SKIN CHANGES DUE TO ULTRAVIOLET RADIATION: ICD-10-CM | Status: INADEQUATELY CONTROLLED

## 2024-01-24 PROCEDURE — 99213 OFFICE O/P EST LOW 20 MIN: CPT | Mod: 25

## 2024-01-24 PROCEDURE — 17000 DESTRUCT PREMALG LESION: CPT

## 2024-01-24 PROCEDURE — ? PRESCRIPTION MEDICATION MANAGEMENT

## 2024-01-24 PROCEDURE — ? COUNSELING

## 2024-01-24 PROCEDURE — ? LIQUID NITROGEN

## 2024-01-24 ASSESSMENT — LOCATION DETAILED DESCRIPTION DERM
LOCATION DETAILED: RIGHT CHIN
LOCATION DETAILED: RIGHT INFERIOR VERMILION LIP
LOCATION DETAILED: LEFT INFERIOR LATERAL FOREHEAD
LOCATION DETAILED: RIGHT MEDIAL FOREHEAD
LOCATION DETAILED: LEFT INFERIOR CENTRAL MALAR CHEEK
LOCATION DETAILED: RIGHT INFERIOR CENTRAL MALAR CHEEK

## 2024-01-24 ASSESSMENT — LOCATION SIMPLE DESCRIPTION DERM
LOCATION SIMPLE: RIGHT LIP
LOCATION SIMPLE: RIGHT CHEEK
LOCATION SIMPLE: LEFT FOREHEAD
LOCATION SIMPLE: LEFT CHEEK
LOCATION SIMPLE: RIGHT FOREHEAD
LOCATION SIMPLE: CHIN

## 2024-01-24 ASSESSMENT — LOCATION ZONE DERM
LOCATION ZONE: LIP
LOCATION ZONE: FACE

## 2024-01-24 NOTE — PROCEDURE: LIQUID NITROGEN
Number Of Freeze-Thaw Cycles: 1 freeze-thaw cycle
Detail Level: Detailed
Show Aperture Variable?: Yes
Render Post-Care Instructions In Note?: no
Application Tool (Optional): Cry-AC
Post-Care Instructions: I reviewed with the patient in detail post-care instructions. Patient is to wear sunprotection, and avoid picking at any of the treated lesions. Pt may apply Vaseline to crusted or scabbing areas.
Consent: The patient's consent was obtained including but not limited to risks of crusting, scabbing, blistering, scarring, darker or lighter pigmentary change, recurrence, incomplete removal and infection.

## 2024-01-24 NOTE — PROCEDURE: PRESCRIPTION MEDICATION MANAGEMENT
Detail Level: Zone
Initiate Treatment: Fluorouracil 5% + calcipotriene 0.005% topical cream - apply a thin layer once daily to the lower lip ONLY for x4 days. Repeat monthly. Apply a thin layer of Vaseline to the lips when needed.\\n\\nApply a thin layer to the backs of hands daily x4 days. Repeat monthly.
Render In Strict Bullet Format?: No

## 2024-05-09 ENCOUNTER — APPOINTMENT (RX ONLY)
Dept: URBAN - NONMETROPOLITAN AREA CLINIC 25 | Facility: CLINIC | Age: 79
Setting detail: DERMATOLOGY
End: 2024-05-09

## 2024-05-09 DIAGNOSIS — L82.1 OTHER SEBORRHEIC KERATOSIS: ICD-10-CM | Status: UNCHANGED

## 2024-05-09 DIAGNOSIS — L82.0 INFLAMED SEBORRHEIC KERATOSIS: ICD-10-CM

## 2024-05-09 DIAGNOSIS — Z09 ENCOUNTER FOR FOLLOW-UP EXAMINATION AFTER COMPLETED TREATMENT FOR CONDITIONS OTHER THAN MALIGNANT NEOPLASM: ICD-10-CM

## 2024-05-09 DIAGNOSIS — L57.0 ACTINIC KERATOSIS: ICD-10-CM | Status: INADEQUATELY CONTROLLED

## 2024-05-09 DIAGNOSIS — L81.4 OTHER MELANIN HYPERPIGMENTATION: ICD-10-CM | Status: UNCHANGED

## 2024-05-09 DIAGNOSIS — L56.8 OTHER SPECIFIED ACUTE SKIN CHANGES DUE TO ULTRAVIOLET RADIATION: ICD-10-CM | Status: RESOLVED

## 2024-05-09 PROCEDURE — ? COUNSELING

## 2024-05-09 PROCEDURE — 99213 OFFICE O/P EST LOW 20 MIN: CPT

## 2024-05-09 PROCEDURE — ? PRESCRIPTION MEDICATION MANAGEMENT

## 2024-05-09 ASSESSMENT — LOCATION DETAILED DESCRIPTION DERM
LOCATION DETAILED: LEFT PROXIMAL DORSAL FOREARM
LOCATION DETAILED: RIGHT ULNAR DORSAL HAND
LOCATION DETAILED: RIGHT LATERAL ELBOW
LOCATION DETAILED: LEFT ULNAR DORSAL HAND
LOCATION DETAILED: LEFT INFERIOR VERMILION LIP
LOCATION DETAILED: RIGHT PROXIMAL DORSAL FOREARM
LOCATION DETAILED: LEFT RADIAL DORSAL HAND
LOCATION DETAILED: LEFT PROXIMAL RADIAL DORSAL FOREARM
LOCATION DETAILED: LEFT DISTAL DORSAL FOREARM
LOCATION DETAILED: RIGHT RADIAL DORSAL HAND

## 2024-05-09 ASSESSMENT — LOCATION SIMPLE DESCRIPTION DERM
LOCATION SIMPLE: RIGHT HAND
LOCATION SIMPLE: LEFT HAND
LOCATION SIMPLE: LEFT FOREARM
LOCATION SIMPLE: RIGHT FOREARM
LOCATION SIMPLE: RIGHT ELBOW
LOCATION SIMPLE: LEFT LIP

## 2024-05-09 ASSESSMENT — LOCATION ZONE DERM
LOCATION ZONE: LIP
LOCATION ZONE: ARM
LOCATION ZONE: HAND

## 2024-05-09 NOTE — PROCEDURE: PRESCRIPTION MEDICATION MANAGEMENT
Detail Level: Zone
Initiate Treatment: Efudex compound apply thin layer to back of hands twice daily for 4 days
Render In Strict Bullet Format?: No

## 2024-05-09 NOTE — PROCEDURE: COUNSELING
Detail Level: Zone
Detail Level: Simple
Patient Specific Counseling (Will Not Stick From Patient To Patient): Patient expressed continued roughness, discussed continued spot treatment with Efudex compound as needed
Detail Level: Detailed

## 2024-11-13 ENCOUNTER — APPOINTMENT (RX ONLY)
Dept: URBAN - NONMETROPOLITAN AREA CLINIC 25 | Facility: CLINIC | Age: 79
Setting detail: DERMATOLOGY
End: 2024-11-13

## 2024-11-13 DIAGNOSIS — D18.0 HEMANGIOMA: ICD-10-CM

## 2024-11-13 DIAGNOSIS — L81.4 OTHER MELANIN HYPERPIGMENTATION: ICD-10-CM

## 2024-11-13 DIAGNOSIS — Z85.828 PERSONAL HISTORY OF OTHER MALIGNANT NEOPLASM OF SKIN: ICD-10-CM | Status: STABLE

## 2024-11-13 DIAGNOSIS — L82.1 OTHER SEBORRHEIC KERATOSIS: ICD-10-CM

## 2024-11-13 DIAGNOSIS — D17 BENIGN LIPOMATOUS NEOPLASM: ICD-10-CM | Status: STABLE

## 2024-11-13 DIAGNOSIS — L57.0 ACTINIC KERATOSIS: ICD-10-CM | Status: INADEQUATELY CONTROLLED

## 2024-11-13 PROBLEM — D18.01 HEMANGIOMA OF SKIN AND SUBCUTANEOUS TISSUE: Status: ACTIVE | Noted: 2024-11-13

## 2024-11-13 PROBLEM — D17.1 BENIGN LIPOMATOUS NEOPLASM OF SKIN AND SUBCUTANEOUS TISSUE OF TRUNK: Status: ACTIVE | Noted: 2024-11-13

## 2024-11-13 PROCEDURE — 17000 DESTRUCT PREMALG LESION: CPT

## 2024-11-13 PROCEDURE — 17003 DESTRUCT PREMALG LES 2-14: CPT

## 2024-11-13 PROCEDURE — 99213 OFFICE O/P EST LOW 20 MIN: CPT | Mod: 25

## 2024-11-13 PROCEDURE — ? COUNSELING

## 2024-11-13 PROCEDURE — ? CONSULTATION EXCISION

## 2024-11-13 PROCEDURE — ? LIQUID NITROGEN

## 2024-11-13 ASSESSMENT — LOCATION SIMPLE DESCRIPTION DERM
LOCATION SIMPLE: RIGHT FOREARM
LOCATION SIMPLE: LEFT CHEEK
LOCATION SIMPLE: LEFT FOREHEAD
LOCATION SIMPLE: RIGHT CHEEK
LOCATION SIMPLE: LEFT FOREARM
LOCATION SIMPLE: UPPER BACK
LOCATION SIMPLE: RIGHT UPPER BACK
LOCATION SIMPLE: RIGHT INDEX FINGER
LOCATION SIMPLE: LEFT HAND
LOCATION SIMPLE: RIGHT FOREHEAD
LOCATION SIMPLE: LEFT PRETIBIAL REGION
LOCATION SIMPLE: RIGHT HAND

## 2024-11-13 ASSESSMENT — LOCATION DETAILED DESCRIPTION DERM
LOCATION DETAILED: RIGHT MID-UPPER BACK
LOCATION DETAILED: RIGHT DISTAL RADIAL DORSAL FOREARM
LOCATION DETAILED: RIGHT MEDIAL FOREHEAD
LOCATION DETAILED: RIGHT ULNAR DORSAL HAND
LOCATION DETAILED: LEFT DISTAL DORSAL FOREARM
LOCATION DETAILED: RIGHT PROXIMAL DORSAL INDEX FINGER
LOCATION DETAILED: RIGHT DORSAL MIDDLE FINGER METACARPOPHALANGEAL JOINT
LOCATION DETAILED: RIGHT INFERIOR LATERAL MALAR CHEEK
LOCATION DETAILED: LEFT ULNAR DORSAL HAND
LOCATION DETAILED: LEFT INFERIOR LATERAL MALAR CHEEK
LOCATION DETAILED: RIGHT INFERIOR CENTRAL MALAR CHEEK
LOCATION DETAILED: RIGHT SUPERIOR UPPER BACK
LOCATION DETAILED: INFERIOR THORACIC SPINE
LOCATION DETAILED: LEFT LATERAL FOREHEAD
LOCATION DETAILED: LEFT DORSAL INDEX FINGER METACARPOPHALANGEAL JOINT
LOCATION DETAILED: RIGHT LATERAL FOREHEAD
LOCATION DETAILED: LEFT DISTAL PRETIBIAL REGION

## 2024-11-13 ASSESSMENT — LOCATION ZONE DERM
LOCATION ZONE: FINGER
LOCATION ZONE: HAND
LOCATION ZONE: FACE
LOCATION ZONE: TRUNK
LOCATION ZONE: LEG
LOCATION ZONE: ARM

## 2025-06-04 ENCOUNTER — APPOINTMENT (OUTPATIENT)
Dept: URBAN - NONMETROPOLITAN AREA CLINIC 25 | Facility: CLINIC | Age: 80
Setting detail: DERMATOLOGY
End: 2025-06-04

## 2025-06-04 DIAGNOSIS — Z85.828 PERSONAL HISTORY OF OTHER MALIGNANT NEOPLASM OF SKIN: ICD-10-CM | Status: STABLE

## 2025-06-04 DIAGNOSIS — R23.3 SPONTANEOUS ECCHYMOSES: ICD-10-CM

## 2025-06-04 DIAGNOSIS — L57.0 ACTINIC KERATOSIS: ICD-10-CM

## 2025-06-04 DIAGNOSIS — D18.0 HEMANGIOMA: ICD-10-CM

## 2025-06-04 DIAGNOSIS — L81.4 OTHER MELANIN HYPERPIGMENTATION: ICD-10-CM

## 2025-06-04 DIAGNOSIS — D22 MELANOCYTIC NEVI: ICD-10-CM

## 2025-06-04 DIAGNOSIS — D69.2 OTHER NONTHROMBOCYTOPENIC PURPURA: ICD-10-CM

## 2025-06-04 PROBLEM — D18.01 HEMANGIOMA OF SKIN AND SUBCUTANEOUS TISSUE: Status: ACTIVE | Noted: 2025-06-04

## 2025-06-04 PROBLEM — D22.5 MELANOCYTIC NEVI OF TRUNK: Status: ACTIVE | Noted: 2025-06-04

## 2025-06-04 PROCEDURE — ? LIQUID NITROGEN

## 2025-06-04 PROCEDURE — ? COUNSELING

## 2025-06-04 PROCEDURE — ? TREATMENT REGIMEN

## 2025-06-04 ASSESSMENT — LOCATION DETAILED DESCRIPTION DERM
LOCATION DETAILED: RIGHT RADIAL DORSAL HAND
LOCATION DETAILED: LEFT INFERIOR LATERAL MIDBACK
LOCATION DETAILED: LEFT DORSAL INDEX FINGER METACARPOPHALANGEAL JOINT
LOCATION DETAILED: 2ND WEB SPACE RIGHT HAND
LOCATION DETAILED: LEFT DISTAL DORSAL FOREARM
LOCATION DETAILED: LEFT PROXIMAL DORSAL FOREARM
LOCATION DETAILED: RIGHT DISTAL RADIAL DORSAL FOREARM
LOCATION DETAILED: SUPERIOR LUMBAR SPINE
LOCATION DETAILED: RIGHT CENTRAL MALAR CHEEK
LOCATION DETAILED: LEFT DISTAL PRETIBIAL REGION
LOCATION DETAILED: RIGHT DISTAL POSTERIOR UPPER ARM
LOCATION DETAILED: RIGHT DISTAL DORSAL FOREARM
LOCATION DETAILED: RIGHT PROXIMAL RADIAL DORSAL FOREARM
LOCATION DETAILED: LEFT INFERIOR VERMILION LIP
LOCATION DETAILED: LEFT ELBOW

## 2025-06-04 ASSESSMENT — LOCATION SIMPLE DESCRIPTION DERM
LOCATION SIMPLE: LEFT LIP
LOCATION SIMPLE: LEFT LOWER BACK
LOCATION SIMPLE: RIGHT POSTERIOR UPPER ARM
LOCATION SIMPLE: RIGHT FOREARM
LOCATION SIMPLE: LOWER BACK
LOCATION SIMPLE: RIGHT HAND
LOCATION SIMPLE: LEFT FOREARM
LOCATION SIMPLE: LEFT HAND
LOCATION SIMPLE: RIGHT CHEEK
LOCATION SIMPLE: LEFT ELBOW
LOCATION SIMPLE: LEFT PRETIBIAL REGION

## 2025-06-04 ASSESSMENT — LOCATION ZONE DERM
LOCATION ZONE: ARM
LOCATION ZONE: LIP
LOCATION ZONE: TRUNK
LOCATION ZONE: HAND
LOCATION ZONE: LEG
LOCATION ZONE: FACE

## 2025-06-04 NOTE — PROCEDURE: LIQUID NITROGEN
Application Tool (Optional): Cry-AC
Render Note In Bullet Format When Appropriate: No
Consent: The patient's consent was obtained including but not limited to risks of crusting, scabbing, blistering, scarring, darker or lighter pigmentary change, recurrence, incomplete removal and infection.
Show Aperture Variable?: Yes
Post-Care Instructions: I reviewed with the patient in detail post-care instructions. Patient is to wear sunprotection, and avoid picking at any of the treated lesions. Pt may apply Vaseline to crusted or scabbing areas.
Number Of Freeze-Thaw Cycles: 1 freeze-thaw cycle
Detail Level: Detailed